# Patient Record
Sex: FEMALE
[De-identification: names, ages, dates, MRNs, and addresses within clinical notes are randomized per-mention and may not be internally consistent; named-entity substitution may affect disease eponyms.]

---

## 2018-01-31 ENCOUNTER — HOSPITAL ENCOUNTER (EMERGENCY)
Dept: HOSPITAL 92 - ERS | Age: 48
Discharge: HOME | End: 2018-01-31
Payer: SELF-PAY

## 2018-01-31 DIAGNOSIS — R07.89: ICD-10-CM

## 2018-01-31 DIAGNOSIS — I45.6: ICD-10-CM

## 2018-01-31 DIAGNOSIS — J11.1: Primary | ICD-10-CM

## 2018-01-31 DIAGNOSIS — G43.909: ICD-10-CM

## 2018-01-31 DIAGNOSIS — Z79.899: ICD-10-CM

## 2018-01-31 DIAGNOSIS — J45.909: ICD-10-CM

## 2018-01-31 LAB
ALBUMIN SERPL BCG-MCNC: 3.9 G/DL (ref 3.5–5)
ALP SERPL-CCNC: 69 U/L (ref 40–150)
ALT SERPL W P-5'-P-CCNC: 12 U/L (ref 8–55)
ANION GAP SERPL CALC-SCNC: 12 MMOL/L (ref 10–20)
AST SERPL-CCNC: 12 U/L (ref 5–34)
BASOPHILS # BLD AUTO: 0.1 THOU/UL (ref 0–0.2)
BASOPHILS NFR BLD AUTO: 0.7 % (ref 0–1)
BILIRUB SERPL-MCNC: 0.6 MG/DL (ref 0.2–1.2)
BUN SERPL-MCNC: 10 MG/DL (ref 7–18.7)
CALCIUM SERPL-MCNC: 8.9 MG/DL (ref 7.8–10.44)
CHLORIDE SERPL-SCNC: 104 MMOL/L (ref 98–107)
CK MB SERPL-MCNC: 0.8 NG/ML (ref 0–6.6)
CK SERPL-CCNC: 71 U/L (ref 29–168)
CO2 SERPL-SCNC: 24 MMOL/L (ref 22–29)
CREAT CL PREDICTED SERPL C-G-VRATE: 0 ML/MIN (ref 70–130)
EOSINOPHIL # BLD AUTO: 0.4 THOU/UL (ref 0–0.7)
EOSINOPHIL NFR BLD AUTO: 3.8 % (ref 0–10)
GLOBULIN SER CALC-MCNC: 3.7 G/DL (ref 2.4–3.5)
GLUCOSE SERPL-MCNC: 94 MG/DL (ref 70–105)
HGB BLD-MCNC: 13.9 G/DL (ref 12–16)
LIPASE SERPL-CCNC: 30 U/L (ref 8–78)
LYMPHOCYTES # BLD: 2.5 THOU/UL (ref 1.2–3.4)
LYMPHOCYTES NFR BLD AUTO: 26.8 % (ref 21–51)
MCH RBC QN AUTO: 31.8 PG (ref 27–31)
MCV RBC AUTO: 94.2 FL (ref 81–99)
MONOCYTES # BLD AUTO: 0.6 THOU/UL (ref 0.11–0.59)
MONOCYTES NFR BLD AUTO: 5.8 % (ref 0–10)
NEUTROPHILS # BLD AUTO: 5.9 THOU/UL (ref 1.4–6.5)
NEUTROPHILS NFR BLD AUTO: 62.8 % (ref 42–75)
PLATELET # BLD AUTO: 236 THOU/UL (ref 130–400)
POTASSIUM SERPL-SCNC: 3.9 MMOL/L (ref 3.5–5.1)
RBC # BLD AUTO: 4.36 MILL/UL (ref 4.2–5.4)
SODIUM SERPL-SCNC: 136 MMOL/L (ref 136–145)
TROPONIN I SERPL DL<=0.01 NG/ML-MCNC: 0.01 NG/ML (ref ?–0.03)
WBC # BLD AUTO: 9.4 THOU/UL (ref 4.8–10.8)

## 2018-01-31 PROCEDURE — 71045 X-RAY EXAM CHEST 1 VIEW: CPT

## 2018-01-31 PROCEDURE — 83690 ASSAY OF LIPASE: CPT

## 2018-01-31 PROCEDURE — 80053 COMPREHEN METABOLIC PANEL: CPT

## 2018-01-31 PROCEDURE — 84484 ASSAY OF TROPONIN QUANT: CPT

## 2018-01-31 PROCEDURE — 82553 CREATINE MB FRACTION: CPT

## 2018-01-31 PROCEDURE — 36415 COLL VENOUS BLD VENIPUNCTURE: CPT

## 2018-01-31 PROCEDURE — 96374 THER/PROPH/DIAG INJ IV PUSH: CPT

## 2018-01-31 PROCEDURE — 85025 COMPLETE CBC W/AUTO DIFF WBC: CPT

## 2018-01-31 PROCEDURE — 93005 ELECTROCARDIOGRAM TRACING: CPT

## 2018-01-31 NOTE — RAD
PORTABLE CHEST:

1/31/18

 

HISTORY: 

Patient with heart palpitations and shortness of breath. 

 

COMPARISON:  

8/17/17 study.

 

Heart size and mediastinum are within normal limits. The lungs are clear of infiltrates. No bony find
ings.

 

IMPRESSION:  

No active intrathoracic disease. 

 

POS: [facility]

## 2018-02-03 NOTE — EKG
Test Reason : 

Blood Pressure : ***/*** mmHG

Vent. Rate : 081 BPM     Atrial Rate : 081 BPM

   P-R Int : 136 ms          QRS Dur : 076 ms

    QT Int : 382 ms       P-R-T Axes : 054 029 037 degrees

   QTc Int : 443 ms

 

Normal sinus rhythm

Normal ECG

 

Confirmed by ERI RODRIGUEZ, GRACE (12),  JAEL RANGEL ([id number]) on 2/3/2018 12:09:38 PM

 

Referred By:             Confirmed By:GRACE HWANG MD

## 2018-05-03 ENCOUNTER — HOSPITAL ENCOUNTER (INPATIENT)
Dept: HOSPITAL 92 - ERS | Age: 48
LOS: 8 days | Discharge: HOME | DRG: 103 | End: 2018-05-11
Attending: INTERNAL MEDICINE | Admitting: INTERNAL MEDICINE
Payer: SELF-PAY

## 2018-05-03 VITALS — BODY MASS INDEX: 38.7 KG/M2

## 2018-05-03 DIAGNOSIS — I45.6: ICD-10-CM

## 2018-05-03 DIAGNOSIS — Z88.2: ICD-10-CM

## 2018-05-03 DIAGNOSIS — G43.911: Primary | ICD-10-CM

## 2018-05-03 DIAGNOSIS — E66.9: ICD-10-CM

## 2018-05-03 DIAGNOSIS — G62.9: ICD-10-CM

## 2018-05-03 DIAGNOSIS — F41.9: ICD-10-CM

## 2018-05-03 DIAGNOSIS — H81.10: ICD-10-CM

## 2018-05-03 DIAGNOSIS — Z88.0: ICD-10-CM

## 2018-05-03 DIAGNOSIS — E03.9: ICD-10-CM

## 2018-05-03 DIAGNOSIS — R00.0: ICD-10-CM

## 2018-05-03 LAB
ALBUMIN SERPL BCG-MCNC: 4.2 G/DL (ref 3.5–5)
ALP SERPL-CCNC: 69 U/L (ref 40–150)
ALT SERPL W P-5'-P-CCNC: 17 U/L (ref 8–55)
ANION GAP SERPL CALC-SCNC: 13 MMOL/L (ref 10–20)
AST SERPL-CCNC: 17 U/L (ref 5–34)
BASOPHILS # BLD AUTO: 0.1 THOU/UL (ref 0–0.2)
BASOPHILS NFR BLD AUTO: 1 % (ref 0–1)
BILIRUB SERPL-MCNC: 0.3 MG/DL (ref 0.2–1.2)
BUN SERPL-MCNC: 13 MG/DL (ref 7–18.7)
CALCIUM SERPL-MCNC: 9.6 MG/DL (ref 7.8–10.44)
CHLORIDE SERPL-SCNC: 102 MMOL/L (ref 98–107)
CK MB SERPL-MCNC: 1 NG/ML (ref 0–6.6)
CO2 SERPL-SCNC: 25 MMOL/L (ref 22–29)
CREAT CL PREDICTED SERPL C-G-VRATE: 0 ML/MIN (ref 70–130)
CRYSTAL-AUWI FLAG: 0 (ref 0–15)
DRUG SCREEN CUTOFF: (no result)
EOSINOPHIL # BLD AUTO: 0.3 THOU/UL (ref 0–0.7)
EOSINOPHIL NFR BLD AUTO: 2.9 % (ref 0–10)
GLOBULIN SER CALC-MCNC: 4.1 G/DL (ref 2.4–3.5)
GLUCOSE SERPL-MCNC: 94 MG/DL (ref 70–105)
HEV IGM SER QL: 1.1 (ref 0–7.99)
HGB BLD-MCNC: 15.2 G/DL (ref 12–16)
HYALINE CASTS #/AREA URNS LPF: (no result) LPF
LYMPHOCYTES # BLD: 2.6 THOU/UL (ref 1.2–3.4)
LYMPHOCYTES NFR BLD AUTO: 23.2 % (ref 21–51)
MCH RBC QN AUTO: 31.2 PG (ref 27–31)
MCV RBC AUTO: 92.9 FL (ref 81–99)
MEDTOX CONTROL LINE VALID?: (no result)
MEDTOX READER #: (no result)
MONOCYTES # BLD AUTO: 0.7 THOU/UL (ref 0.11–0.59)
MONOCYTES NFR BLD AUTO: 6 % (ref 0–10)
NEUTROPHILS # BLD AUTO: 7.5 THOU/UL (ref 1.4–6.5)
NEUTROPHILS NFR BLD AUTO: 66.9 % (ref 42–75)
PATHC CAST-AUWI FLAG: 0.14 (ref 0–2.49)
PLATELET # BLD AUTO: 296 THOU/UL (ref 130–400)
POTASSIUM SERPL-SCNC: 3.8 MMOL/L (ref 3.5–5.1)
RBC # BLD AUTO: 4.86 MILL/UL (ref 4.2–5.4)
RBC UR QL AUTO: (no result) HPF (ref 0–3)
SODIUM SERPL-SCNC: 136 MMOL/L (ref 136–145)
SP GR UR STRIP: 1.01 (ref 1–1.04)
SPERM-AUWI FLAG: 0 (ref 0–9.9)
TROPONIN I SERPL DL<=0.01 NG/ML-MCNC: (no result) NG/ML (ref ?–0.03)
WBC # BLD AUTO: 11.2 THOU/UL (ref 4.8–10.8)
YEAST-AUWI FLAG: 0 (ref 0–25)

## 2018-05-03 PROCEDURE — 36415 COLL VENOUS BLD VENIPUNCTURE: CPT

## 2018-05-03 PROCEDURE — A4216 STERILE WATER/SALINE, 10 ML: HCPCS

## 2018-05-03 PROCEDURE — 96376 TX/PRO/DX INJ SAME DRUG ADON: CPT

## 2018-05-03 PROCEDURE — 87070 CULTURE OTHR SPECIMN AEROBIC: CPT

## 2018-05-03 PROCEDURE — 70551 MRI BRAIN STEM W/O DYE: CPT

## 2018-05-03 PROCEDURE — 84484 ASSAY OF TROPONIN QUANT: CPT

## 2018-05-03 PROCEDURE — 93005 ELECTROCARDIOGRAM TRACING: CPT

## 2018-05-03 PROCEDURE — 80048 BASIC METABOLIC PNL TOTAL CA: CPT

## 2018-05-03 PROCEDURE — 96365 THER/PROPH/DIAG IV INF INIT: CPT

## 2018-05-03 PROCEDURE — 96367 TX/PROPH/DG ADDL SEQ IV INF: CPT

## 2018-05-03 PROCEDURE — 81015 MICROSCOPIC EXAM OF URINE: CPT

## 2018-05-03 PROCEDURE — 85025 COMPLETE CBC W/AUTO DIFF WBC: CPT

## 2018-05-03 PROCEDURE — 83605 ASSAY OF LACTIC ACID: CPT

## 2018-05-03 PROCEDURE — 80053 COMPREHEN METABOLIC PANEL: CPT

## 2018-05-03 PROCEDURE — 82553 CREATINE MB FRACTION: CPT

## 2018-05-03 PROCEDURE — 80306 DRUG TEST PRSMV INSTRMNT: CPT

## 2018-05-03 PROCEDURE — 96375 TX/PRO/DX INJ NEW DRUG ADDON: CPT

## 2018-05-03 PROCEDURE — 89051 BODY FLUID CELL COUNT: CPT

## 2018-05-03 PROCEDURE — 87205 SMEAR GRAM STAIN: CPT

## 2018-05-03 PROCEDURE — 62270 DX LMBR SPI PNXR: CPT

## 2018-05-03 PROCEDURE — 83735 ASSAY OF MAGNESIUM: CPT

## 2018-05-03 PROCEDURE — 82945 GLUCOSE OTHER FLUID: CPT

## 2018-05-03 PROCEDURE — 70450 CT HEAD/BRAIN W/O DYE: CPT

## 2018-05-03 PROCEDURE — 84157 ASSAY OF PROTEIN OTHER: CPT

## 2018-05-03 PROCEDURE — 81003 URINALYSIS AUTO W/O SCOPE: CPT

## 2018-05-04 NOTE — CT
HEAD CT WITHOUT CONTRAST:

 

05/04/2018

 

HISTORY:

Headache with nausea.

 

COMPARISON:

07/09/2017

 

TECHNIQUE:

Serial axial CT imaging at 5 mm intervals, from the vertex through the skull base, without contrast.

 

FINDINGS:

The imaged paranasal sinuses/mastoid air cells are well aerated.  There is no displaced calvarial fra
cture.

 

No intracranial hemorrhage, midline shift, mass effect, or ventricular enlargement.

 

IMPRESSION:

No acute findings.

 

POS: [facility]

## 2018-05-04 NOTE — CON
DATE OF CONSULTATION:  05/04/2018

 

CONSULTING PHYSICIAN:  [facility].

 

IMPRESSION:

1.  Probable migraine.

2.  Questionable history of pseudotumor cerebri.

 

PLAN:  Lucita protocol.

 

HISTORY OF PRESENT ILLNESS:  Ms. Bowers is a 47-year-old white female who reports developing acute on
set of a severe headache yesterday.  She has had some intermittent headaches prior to this.  There wa
s some questionable diagnosis of pseudotumor cerebri.  She was given some medication since admission,
 but is still experiencing a generalized throbbing headache.  She has had some nausea and vomiting as
sociated with it.  Her vision has been a bit distorted.  There is no transient vision loss.  Her CT s
can of the brain was unremarkable.

 

PAST MEDICAL HISTORY:  Intermittent headache associated with migraine.

 

ALLERGIES:  MEPERIDINE, PENICILLIN, SULFA, IODINE.

 

FAMILY HISTORY:  Noncontributory.

 

SOCIAL HISTORY:  Unremarkable.

 

REVIEW OF SYSTEMS:  Otherwise, negative for any focal neurologic symptoms.

 

PHYSICAL EXAMINATION:  Generally she is alert and appropriate.  Her speech is fluent and clear.  Exam
 is nonfocal.  She is photophobic.

 

SUMMARY:  Given the acute onset of headache with a normal CT scan, I suspect this is likely migraine.
  We will see if she responds to the Lucita protocol.
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
## 2018-05-05 LAB
ANION GAP SERPL CALC-SCNC: 13 MMOL/L (ref 10–20)
BASOPHILS # BLD AUTO: 0 THOU/UL (ref 0–0.2)
BASOPHILS NFR BLD AUTO: 0.1 % (ref 0–1)
BUN SERPL-MCNC: 13 MG/DL (ref 7–18.7)
CALCIUM SERPL-MCNC: 8.6 MG/DL (ref 7.8–10.44)
CHLORIDE SERPL-SCNC: 106 MMOL/L (ref 98–107)
CO2 SERPL-SCNC: 22 MMOL/L (ref 22–29)
CREAT CL PREDICTED SERPL C-G-VRATE: 163 ML/MIN (ref 70–130)
EOSINOPHIL # BLD AUTO: 0 THOU/UL (ref 0–0.7)
EOSINOPHIL NFR BLD AUTO: 0.1 % (ref 0–10)
GLUCOSE SERPL-MCNC: 119 MG/DL (ref 70–105)
HGB BLD-MCNC: 14.3 G/DL (ref 12–16)
LYMPHOCYTES # BLD: 1.1 THOU/UL (ref 1.2–3.4)
LYMPHOCYTES NFR BLD AUTO: 7.3 % (ref 21–51)
MCH RBC QN AUTO: 31.6 PG (ref 27–31)
MCV RBC AUTO: 94.2 FL (ref 81–99)
MONOCYTES # BLD AUTO: 0.3 THOU/UL (ref 0.11–0.59)
MONOCYTES NFR BLD AUTO: 2.2 % (ref 0–10)
NEUTROPHILS # BLD AUTO: 14.1 THOU/UL (ref 1.4–6.5)
NEUTROPHILS NFR BLD AUTO: 90.3 % (ref 42–75)
PLATELET # BLD AUTO: 277 THOU/UL (ref 130–400)
POTASSIUM SERPL-SCNC: 4.3 MMOL/L (ref 3.5–5.1)
RBC # BLD AUTO: 4.51 MILL/UL (ref 4.2–5.4)
SODIUM SERPL-SCNC: 137 MMOL/L (ref 136–145)
WBC # BLD AUTO: 15.6 THOU/UL (ref 4.8–10.8)

## 2018-05-05 RX ADMIN — Medication SCH: at 09:24

## 2018-05-05 RX ADMIN — Medication SCH: at 21:56

## 2018-05-06 RX ADMIN — GUAIFENESIN AND DEXTROMETHORPHAN PRN ML: 100; 10 SYRUP ORAL at 10:52

## 2018-05-06 RX ADMIN — Medication SCH: at 15:02

## 2018-05-06 RX ADMIN — Medication SCH ML: at 21:37

## 2018-05-07 LAB
RBC # CSF MANUAL: 68 /CUMM
SPECIMEN SOURCE FLD: (no result)
WBC # CSF MANUAL: 4 /CUMM (ref 0–5)

## 2018-05-07 PROCEDURE — B01B1ZZ FLUOROSCOPY OF SPINAL CORD USING LOW OSMOLAR CONTRAST: ICD-10-PCS | Performed by: RADIOLOGY

## 2018-05-07 PROCEDURE — 009U3ZX DRAINAGE OF SPINAL CANAL, PERCUTANEOUS APPROACH, DIAGNOSTIC: ICD-10-PCS | Performed by: RADIOLOGY

## 2018-05-07 RX ADMIN — Medication SCH: at 20:56

## 2018-05-07 RX ADMIN — Medication SCH: at 09:08

## 2018-05-07 NOTE — RAD
FLUOROSCOPIC GUIDED LUMBAR PUNCTURE:

 

DATE: 5/7/18.

 

HISTORY: 

Intractable headache.  Headache is getting worse.  History of prior subarachnoid hemorrhage.

 

TECHNIQUE: 

The procedure including the risks and complications were explained to the patient and informed consen
t was obtained.  The patient was placed on the fluoroscopy table in the prone position.  Limited fluo
roscopic evaluation of the lumbar spine was performed.  An area at the L3-4 level was marked and them
 meticulously prepped and draped in the usual sterile fashion.  The skin and subcutaneous tissues wer
e infiltrated with buffered 1% Lidocaine for local anesthesia.  Utilizing fluoroscopic guidance, a 22
-gauge spinal needle was advanced into the thecal sac.  The inner stylette was removed with return of
 clear cerebrospinal fluid.  Opening pressure was obtained.  Approximately 10 mL of clear cerebrospin
al fluid was collected and closing pressure was then performed.  

 

The inner stylette was replaced, and the needle was removed.  Hemostasis was achieved with direct pre
ssure.  A dry sterile dressing was placed at the puncture site. 

 

The patient tolerated the procedure well and without immediate complication.

 

FINDINGS: 

Technically successful lumbar puncture.  Approximately 10 mL of clear cerebrospinal fluid was collect
ed.  The patient did have elevated opening pressure of 33 cm of water with elevated closing pressure 
of 23 cm of water.

 

 AP and lateral views lumbar spine demonstrate mild degenerative changes.  Vertebral body height
s are within normal limits and there is no fracture or subluxation.

 

FLUOROSCOPY:

Total fluoroscopy time is 0.4 minutes with total dose of 90.9 mGy.

 

IMPRESSION: 

1.  Technically successful fluoroscopic-guided lumbar puncture.

 

2.  Elevated opening pressure.

 

3.  The above findings were discussed with Dr. Worthington on 5/7/18 at 1411 hours.

 

 

CODE CR

 

POS: [facility]

## 2018-05-08 RX ADMIN — Medication SCH: at 12:05

## 2018-05-08 RX ADMIN — GUAIFENESIN AND DEXTROMETHORPHAN PRN ML: 100; 10 SYRUP ORAL at 21:50

## 2018-05-09 LAB
ANION GAP SERPL CALC-SCNC: 9 MMOL/L (ref 10–20)
BASOPHILS # BLD AUTO: 0 THOU/UL (ref 0–0.2)
BASOPHILS NFR BLD AUTO: 0.4 % (ref 0–1)
BUN SERPL-MCNC: 9 MG/DL (ref 7–18.7)
CALCIUM SERPL-MCNC: 8.7 MG/DL (ref 7.8–10.44)
CHLORIDE SERPL-SCNC: 103 MMOL/L (ref 98–107)
CO2 SERPL-SCNC: 29 MMOL/L (ref 22–29)
CREAT CL PREDICTED SERPL C-G-VRATE: 161 ML/MIN (ref 70–130)
EOSINOPHIL # BLD AUTO: 0.7 THOU/UL (ref 0–0.7)
EOSINOPHIL NFR BLD AUTO: 7.8 % (ref 0–10)
GLUCOSE SERPL-MCNC: 89 MG/DL (ref 70–105)
HGB BLD-MCNC: 13.2 G/DL (ref 12–16)
LYMPHOCYTES # BLD: 2.8 THOU/UL (ref 1.2–3.4)
LYMPHOCYTES NFR BLD AUTO: 31.1 % (ref 21–51)
MAGNESIUM SERPL-MCNC: 2 MG/DL (ref 1.6–2.6)
MCH RBC QN AUTO: 31.1 PG (ref 27–31)
MCV RBC AUTO: 95.2 FL (ref 81–99)
MONOCYTES # BLD AUTO: 0.7 THOU/UL (ref 0.11–0.59)
MONOCYTES NFR BLD AUTO: 8.1 % (ref 0–10)
NEUTROPHILS # BLD AUTO: 4.7 THOU/UL (ref 1.4–6.5)
NEUTROPHILS NFR BLD AUTO: 52.6 % (ref 42–75)
PLATELET # BLD AUTO: 221 THOU/UL (ref 130–400)
POTASSIUM SERPL-SCNC: 4.3 MMOL/L (ref 3.5–5.1)
RBC # BLD AUTO: 4.26 MILL/UL (ref 4.2–5.4)
SODIUM SERPL-SCNC: 137 MMOL/L (ref 136–145)
WBC # BLD AUTO: 8.9 THOU/UL (ref 4.8–10.8)

## 2018-05-09 RX ADMIN — Medication SCH ML: at 22:41

## 2018-05-09 RX ADMIN — Medication SCH: at 06:39

## 2018-05-09 RX ADMIN — Medication SCH ML: at 09:57

## 2018-05-09 NOTE — CT
PRELIMINARY REPORT/[facility]/EMERGENTY AFTER-HOURS PROCEDURE 

 

CT Head Without Intravenous Contrast

 

CLINICAL HISTORY:

47 years old, female; Pain; Headache; Patient HX: Worsening headache, lumbar puncture done earlier to
day

 

TECHNIQUE:

Axial computed tomography images of the head/brain without intravenous contrast.

 

COMPARISON:

No relevant prior studies available.

 

FINDINGS:

Brain: Low-lying cerebellar tonsils which may sectional No hemorrhage. No significant white matter di
sease. No edema.

Ventricles: Unremarkable. No ventriculomegaly.

Bones/joints: Unremarkable. No acute fracture.

Soft tissues: Unremarkable.

Sinuses: Unremarkable as visualized. No acute sinusitis.

Mastoid air cells: Unremarkable as visualized. No mastoid effusion.

 

IMPRESSION:

Low lying cerebellar tonsils which may. Further evaluation with MRI as clinically indicated if clinic
al concern for intracranial hypotension suspected

No intracranial hemorrhage. Please see discussion above.

 

Thank you for allowing us to participate in the care of your patient.

Dictated and Authenticated by: Jairo Spears MD

05/09/2018 12:32 AM Central Time ([place] & [place])

 

 

FINAL REPORT

EMERGENCY AFTER HOURS CT HEAD:

 

Date:  05/09/18 

 

HISTORY:  

Worsening headache. Lumbar puncture done earlier today. 

 

COMPARISON:  

05/04/18. 

 

IMPRESSION: 

1.  No acute intracranial abnormalities demonstrated. 

 

2.  Cerebellar tonsils do appear to be low-lying. However, this could be attributable to slice select
ion, and this is not well evaluated on CT exam. MRI may be helpful to further evaluate for possibilit
y of low-lying cerebellar tonsils. 

 

Findings are in agreement with the preliminary report by [facility]. 

 

 

POS: [facility]

## 2018-05-09 NOTE — PDOC.EVN
Event Note





- Event Note


Event Note: 





RN called earlier with worsening headache. Had LP earlier today. CT brain 

ordered.

## 2018-05-10 RX ADMIN — Medication SCH ML: at 22:02

## 2018-05-10 RX ADMIN — Medication SCH ML: at 08:45

## 2018-05-11 VITALS — DIASTOLIC BLOOD PRESSURE: 71 MMHG | SYSTOLIC BLOOD PRESSURE: 105 MMHG

## 2018-05-11 VITALS — TEMPERATURE: 98.1 F

## 2018-05-11 RX ADMIN — Medication SCH ML: at 11:17

## 2018-05-11 RX ADMIN — Medication PRN ML: at 04:07

## 2018-05-11 RX ADMIN — Medication SCH ML: at 07:49

## 2018-05-11 RX ADMIN — Medication PRN ML: at 07:49

## 2018-05-11 NOTE — MRI
MRI OF BRAIN NONCONTRAST:

 

INDICATION: 

Intractable headache.

 

FINDINGS: 

Reference is made to 7/5/17 brain MRI.

 

Ventricular system is normal in size.  There is no evidence of acute territorial infarction, intracra
nial mass effect, or midline shift.  No intracranial hemorrhagic susceptibility.  Limited evaluation 
of skull base flow voids due to patient motion.  No new, significant intraaxial signal abnormalities 
are seen.  Redemonstration of a partially empty sella. 

 

IMPRESSION: 

There are no acute intracranial abnormalities.

 

POS: [facility]

## 2018-05-11 NOTE — DIS
DATE OF ADMISSION:  05/04/2018

 

DATE OF DISCHARGE:  05/11/2018

 

DISCHARGE DIAGNOSES:

1.  Intractable migraine headache.

2.  Nausea and vomiting secondary to #1, resolved.

3.  Vertigo, benign positional, improved.

4.  Peripheral neuropathy.

 

CONSULTATIONS:  Dr. Mikie Iraheta with Neurology Service.

 

PERTINENT LABORATORY AND X-RAY FINDINGS:  Complete metabolic profile within normal limits.  Magnesium
 2.0.  CBC showed a white blood cell count ranging between 8.9-15.6.  Urine drug screen dated 05/03/2
018 negative.  CSF fluid culture dated 05/07/2018 showed no growth at 4 days.  CT of the brain withou
t contrast dated 05/04/2018 showed no acute intracranial process.  CT of the brain dated 05/08/2018 s
howed no intracranial hemorrhage.  Low lying cerebellar tonsils.  MRI of the brain dated 05/11/2018 s
howed no acute intracranial process.

 

HOSPITAL COURSE:  Patient was admitted to the medical floor after presenting with intractable headach
e likely migraine variant.  The patient underwent a comprehensive evaluation including neuro imaging 
studies showing no acute process.  The patient underwent lumbar puncture with CSF analysis showing no
 evidence of infectious process.  The patient received IV valproic acid, Ultram, Neurontin, and morph
ine sulfate for symptomatic relief.  The patient was slow to clinically improve over the hospital cou
rse with persistent headache and nausea.  The patient received antiemetics, IV fluids, and general quevedo
pportive measures.  The patient was evaluated by the Neurology Service due to the severity of her pre
sent symptoms and slow clinical improvement.  The patient continued to receive supportive measures in
cluding NSAIDs with Motrin and Toradol.  The patient did clinically improve by the time of discharge 
and remained stable throughout the hospital course.  I have examined the patient at the time of disch
arge and discussed radiographic and laboratory findings.  The patient verbalizes understanding and ag
reement and will discharge home on 05/11/2018.

 

DISCHARGE MEDICATIONS:

1.  Gabapentin 200 mg p.o. t.i.d.

2.  Motrin 800 mg p.o. t.i.d. p.r.n.

3.  Meclizine 25 mg p.o. q.6 hours p.r.n.

4.  Prednisone 20 mg 1 tablet p.o. daily x 3 days, followed by half a tab p.o. daily x3 days.

5.  Depakene 500 mg p.o. b.i.d.

 

FOLLOWUP:  The patient will follow up with local [facility] on an as needed basis.

 

CONDITION ON DISCHARGE:  Stable.

 

ACTIVITY:  Ad nicola.

 

DIET:  Regular.

 

CODE STATUS:  FULL.

 

DISPOSITION:  Home on 05/11/2018.

## 2018-05-23 ENCOUNTER — HOSPITAL ENCOUNTER (INPATIENT)
Dept: HOSPITAL 92 - ERS | Age: 48
LOS: 5 days | Discharge: HOME | DRG: 103 | End: 2018-05-28
Attending: INTERNAL MEDICINE | Admitting: INTERNAL MEDICINE
Payer: SELF-PAY

## 2018-05-23 VITALS — BODY MASS INDEX: 39.9 KG/M2

## 2018-05-23 DIAGNOSIS — G93.2: ICD-10-CM

## 2018-05-23 DIAGNOSIS — G43.111: Primary | ICD-10-CM

## 2018-05-23 DIAGNOSIS — E66.9: ICD-10-CM

## 2018-05-23 DIAGNOSIS — I45.6: ICD-10-CM

## 2018-05-23 DIAGNOSIS — J45.909: ICD-10-CM

## 2018-05-23 PROCEDURE — 36415 COLL VENOUS BLD VENIPUNCTURE: CPT

## 2018-05-23 PROCEDURE — 85025 COMPLETE CBC W/AUTO DIFF WBC: CPT

## 2018-05-23 PROCEDURE — 96365 THER/PROPH/DIAG IV INF INIT: CPT

## 2018-05-23 PROCEDURE — 80053 COMPREHEN METABOLIC PANEL: CPT

## 2018-05-23 PROCEDURE — 70450 CT HEAD/BRAIN W/O DYE: CPT

## 2018-05-23 PROCEDURE — 82553 CREATINE MB FRACTION: CPT

## 2018-05-23 PROCEDURE — 96361 HYDRATE IV INFUSION ADD-ON: CPT

## 2018-05-23 PROCEDURE — 93005 ELECTROCARDIOGRAM TRACING: CPT

## 2018-05-23 PROCEDURE — 94760 N-INVAS EAR/PLS OXIMETRY 1: CPT

## 2018-05-23 PROCEDURE — 84484 ASSAY OF TROPONIN QUANT: CPT

## 2018-05-23 PROCEDURE — 71045 X-RAY EXAM CHEST 1 VIEW: CPT

## 2018-05-23 PROCEDURE — 70496 CT ANGIOGRAPHY HEAD: CPT

## 2018-05-23 PROCEDURE — 70498 CT ANGIOGRAPHY NECK: CPT

## 2018-05-23 PROCEDURE — 81015 MICROSCOPIC EXAM OF URINE: CPT

## 2018-05-23 PROCEDURE — 96375 TX/PRO/DX INJ NEW DRUG ADDON: CPT

## 2018-05-23 PROCEDURE — A4216 STERILE WATER/SALINE, 10 ML: HCPCS

## 2018-05-23 PROCEDURE — 84443 ASSAY THYROID STIM HORMONE: CPT

## 2018-05-23 PROCEDURE — 36416 COLLJ CAPILLARY BLOOD SPEC: CPT

## 2018-05-23 PROCEDURE — S0028 INJECTION, FAMOTIDINE, 20 MG: HCPCS

## 2018-05-23 PROCEDURE — 81025 URINE PREGNANCY TEST: CPT

## 2018-05-23 PROCEDURE — 96367 TX/PROPH/DG ADDL SEQ IV INF: CPT

## 2018-05-23 PROCEDURE — 81003 URINALYSIS AUTO W/O SCOPE: CPT

## 2018-05-23 PROCEDURE — 83735 ASSAY OF MAGNESIUM: CPT

## 2018-05-23 PROCEDURE — 80048 BASIC METABOLIC PNL TOTAL CA: CPT

## 2018-05-24 LAB
ALBUMIN SERPL BCG-MCNC: 3.6 G/DL (ref 3.5–5)
ALP SERPL-CCNC: 54 U/L (ref 40–150)
ALT SERPL W P-5'-P-CCNC: 11 U/L (ref 8–55)
ANION GAP SERPL CALC-SCNC: 8 MMOL/L (ref 10–20)
AST SERPL-CCNC: 10 U/L (ref 5–34)
BASOPHILS # BLD AUTO: 0.1 THOU/UL (ref 0–0.2)
BASOPHILS NFR BLD AUTO: 0.9 % (ref 0–1)
BILIRUB SERPL-MCNC: 0.4 MG/DL (ref 0.2–1.2)
BUN SERPL-MCNC: 12 MG/DL (ref 7–18.7)
CALCIUM SERPL-MCNC: 8.8 MG/DL (ref 7.8–10.44)
CHLORIDE SERPL-SCNC: 105 MMOL/L (ref 98–107)
CK MB SERPL-MCNC: 0.3 NG/ML (ref 0–6.6)
CO2 SERPL-SCNC: 27 MMOL/L (ref 22–29)
CREAT CL PREDICTED SERPL C-G-VRATE: 0 ML/MIN (ref 70–130)
CRYSTAL-AUWI FLAG: 0 (ref 0–15)
EOSINOPHIL # BLD AUTO: 0.4 THOU/UL (ref 0–0.7)
EOSINOPHIL NFR BLD AUTO: 3.3 % (ref 0–10)
GLOBULIN SER CALC-MCNC: 3.2 G/DL (ref 2.4–3.5)
GLUCOSE SERPL-MCNC: 98 MG/DL (ref 70–105)
HEV IGM SER QL: 1.1 (ref 0–7.99)
HGB BLD-MCNC: 13.4 G/DL (ref 12–16)
HYALINE CASTS #/AREA URNS LPF: (no result) LPF
LYMPHOCYTES # BLD: 2.6 THOU/UL (ref 1.2–3.4)
LYMPHOCYTES NFR BLD AUTO: 22.9 % (ref 21–51)
MAGNESIUM SERPL-MCNC: 1.8 MG/DL (ref 1.6–2.6)
MCH RBC QN AUTO: 31.4 PG (ref 27–31)
MCV RBC AUTO: 93.6 FL (ref 81–99)
MONOCYTES # BLD AUTO: 0.9 THOU/UL (ref 0.11–0.59)
MONOCYTES NFR BLD AUTO: 7.5 % (ref 0–10)
NEUTROPHILS # BLD AUTO: 7.5 THOU/UL (ref 1.4–6.5)
NEUTROPHILS NFR BLD AUTO: 65.3 % (ref 42–75)
PATHC CAST-AUWI FLAG: 0 (ref 0–2.49)
PLATELET # BLD AUTO: 268 THOU/UL (ref 130–400)
POTASSIUM SERPL-SCNC: 3.8 MMOL/L (ref 3.5–5.1)
PREGU CONTROL BACKGROUND?: (no result)
PREGU CONTROL BAR APPEAR?: YES
RBC # BLD AUTO: 4.26 MILL/UL (ref 4.2–5.4)
RBC UR QL AUTO: (no result) HPF (ref 0–3)
SODIUM SERPL-SCNC: 136 MMOL/L (ref 136–145)
SP GR UR STRIP: (no result) (ref 1–1.04)
SPERM-AUWI FLAG: 0 (ref 0–9.9)
TROPONIN I SERPL DL<=0.01 NG/ML-MCNC: (no result) NG/ML (ref ?–0.03)
WBC # BLD AUTO: 11.5 THOU/UL (ref 4.8–10.8)
WBC UR QL AUTO: (no result) HPF (ref 0–3)
YEAST-AUWI FLAG: 0 (ref 0–25)

## 2018-05-24 NOTE — CT
PRELIMINARY REPORT/[facility]/EMERGENTY AFTER-HOURS PROCEDURE 

 

CT Angiography Head With Intravenous Contrast

 

CLINICAL HISTORY:

47 years old, female; Pain; Headache; Patient HX: Er 12; F47 presented to ed C/O intermittent HA with
 gradual onset 3 days ago. Pt reports feeling weakness with symptoms. Pt denies fever. Pt denies

position making the HA better or worse. Pt denies injury or trauma to her head or neck. Pt also repor
ts feeling tingly all over. Pt reports this HA is more severe from her previous episode. Neck pain rt
 sided; Additional info: *iv leaked during injection

 

TECHNIQUE:

Axial computed tomographic angiography images of the head with intravenous contrast using CT angiogra
phy protocol. MIP reconstructed images were created and reviewed. Coronal and sagittal reformatted im
ages were created and reviewed.

 

COMPARISON:

No relevant prior studies available.

 

FINDINGS:

Right internal carotid artery: No acute findings. Intracranial segment is patent with no significant 
stenosis. No aneurysm.

Right anterior cerebral artery: No acute findings. 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 No occ
lusion or significant stenosis. No aneurysm.

Right middle cerebral artery: No acute findings. No occlusion or significant stenosis. No aneurysm.

Right posterior cerebral artery: No acute findings. No occlusion or significant stenosis. No aneurysm
.

Right vertebral artery: Unremarkable as visualized.

Left internal carotid artery: No acute findings. Intracranial segment is patent with no significant s
tenosis. No aneurysm.

Left anterior cerebral artery: No acute findings. No occlusion or significant stenosis. No aneurysm.

Left middle cerebral artery: No acute findings. No occlusion or significant stenosis. No aneurysm.

Left posterior cerebral artery: No acute findings. No occlusion or significant stenosis. No aneurysm.


Left vertebral artery: Unremarkable as visualized.

Basilar artery: No acute findings. No occlusion or significant stenosis. No aneurysm.

Incidental note made of mild to moderate mucosal reaction, right maxillary sinus.

 

IMPRESSION:

No occlusion or significant stenosis. No aneurysm.

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

Incidental note made of mild to moderate mucosal reaction, right maxillary sinus.

_______________________________________________

CT Angiography Neck With Intravenous Contrast

 

TECHNIQUE:

Axial computed tomographic angiography images of the neck with intravenous contrast using CT angiogra
phy protocol.

 

COMPARISON:

CT Brain WO Con 2018-05-24 00:54

 

FINDINGS:

 

VASCULATURE:

Right common carotid artery: No acute findings. No significant stenosis. No dissection or occlusion.

Right internal carotid artery: No acute findings. Extracranial segment is patent with no significant 
stenosis. No dissection or occlusion.

Right external carotid artery: No acute findings. No occlusion.

Right vertebral artery: No acute findings. No significant stenosis. No dissection or occlusion.

Left common carotid artery: No acute findings. No significant stenosis. No dissection or occlusion.

Left internal carotid artery: No acute findings. Extracranial segment is patent with no significant s
tenosis. No dissection or occlusion.

Left external carotid artery: No acute findings. No occlusion.

Left vertebral artery: No acute findings. No significant stenosis. No dissection or occlusion.

 

NECK:

Bones/joints: Chronic degenerative spinal changes without acute fracture or dislocation.

Soft tissues: Nonspecific increased number of borderline enlarged submandibular nodes bilaterally.

 

CAROTID STENOSIS REFERENCE USING NASCET CRITERIA:

% ICA stenosis = 0 percent.

 

IMPRESSION:

No acute findings. No significant stenosis. No dissection or occlusion.

 

Thank you for allowing us to participate in the care of your patient.

Dictated and Authenticated by: Benito Espino MD

05/24/2018 2:46 AM Central Time ([place] & [place])

 

FINAL REPORT

CT ANGIOGRAM OF THE HEAD AND NECK:

 

History: Headache

 

Comparison: 2-27-14

 

Technique: CT angiogram of the head and neck are performed in the axial plane. 3D reformatted images 
are submitted for interpretation. 

 

FINDINGS: 

Examination is suboptimal due to poor timing of bolus. No evidence of vascular occlusion involving th
e cervical, carotid or vertebral arteries. No significant stenosis. Diminutive right A1 segment likel
y due to congenital variant. No significant stenosis at the level of the [place] of [place]. 

 

 

 

POS: [facility]

## 2018-05-24 NOTE — RAD
CHEST 1 VIEW:

 

Date:  05/24/18 

 

COMPARISON:  

01/31/18. 

 

HISTORY:  

Pain. 

 

FINDINGS:

Normal cardiac silhouette. Pulmonary vessels and hilum are normal. Costophrenic angles are clear. No 
masses or consolidation. No pneumothorax or osseous abnormalities. 

 

IMPRESSION: 

No acute cardiopulmonary process. 

 

 

POS: [facility]

## 2018-05-24 NOTE — CT
PRELIMINARY REPORT/[facility]/EMERGENTY AFTER-HOURS PROCEDURE

 

CT Head Without Intravenous Contrast

 

CLINICAL HISTORY:

47 years old, female; Pain; Headache;

 

TECHNIQUE:

Axial computed tomography images of the head/brain without intravenous contrast.

 

COMPARISON:

CT Brain WO Con 2018-05-09 00:15

 

FINDINGS:

Brain: No acute findings. No hemorrhage. No significant white matter disease. No edema.

Ventricles: No acute findings. No ventriculomegaly.

Bones/joints: No acute findings. No acute fracture.

Soft tissues: No acute findings.

Sinuses: No acute sinusitis. Mild mucosal thickening, right maxillary sinus.

Mastoid air cells: Unremarkable as visualized. No mastoid effusion.

 

IMPRESSION:

No acute intracranial pathology.

 

Thank you for allowing us to participate in the care of your patient.

Dictated and Authenticated by: Benito Espino MD

05/24/2018 1:29 AM Central Time ([place] & [place])

 

 

FINAL REPORT

HEAD CT WITHOUT CONTRAST:

 

Date:  05/24/18 

 

COMPARISON:  

None. 

 

HISTORY:  

Headache, pain. 

 

FINDINGS:

I agree with the preliminary report given by [facility]. Mild mucosal thickening of right maxillary sinus n
oted. No acute osseous abnormality. No intracranial hemorrhage, midline shift, mass effect, or ventri
cular enlargement. 

 

IMPRESSION: 

No acute findings.  

 

 

 

POS: [facility]

## 2018-05-25 NOTE — PDOC.PN
- Subjective


Encounter Start Date: 05/25/18


Encounter Start Time: 12:43





Ms. Bowers was seen in follow-up of chronic headache. She says the pain is only 

a 7/10. She also complains of continued nausea.





- Objective


Resuscitation Status: 


 











Resuscitation Status           FULL:Full Resuscitation














MAR Reviewed: Yes


Vital Signs & Weight: 


 Vital Signs (12 hours)











  Temp Pulse Resp BP Pulse Ox


 


 05/25/18 11:12  99.1 F  72  16  105/60  95


 


 05/25/18 08:00  98.1 F  65  18  


 


 05/25/18 07:33  98.1 F  65  18  96/53 L  95


 


 05/25/18 03:33  98.0 F  60  14  101/57 L  95








 Weight











Admit Weight                   247 lb


 


Weight                         251 lb














I&O: 


 











 05/24/18 05/25/18 05/26/18





 06:59 06:59 06:59


 


Intake Total 60 2783 100


 


Output Total  1 


 


Balance 60 2782 100











Result Diagrams: 


 05/24/18 00:45





 05/24/18 00:45





Phys Exam





- Physical Examination


HEENT: PERRLA


Respiratory: no wheezing, no rales, no rhonchi, clear to auscultation bilateral


Cardiovascular: RRR, no significant murmur, no rub


Gastrointestinal: soft, positive bowel sounds


Musculoskeletal: no edema





Dx/Plan


(1) Intractable headache


Code(s): R51 - HEADACHE   Status: Acute   





(2) Asthma


Code(s): J45.909 - UNSPECIFIED ASTHMA, UNCOMPLICATED   Status: Chronic   





(3) Obesity (BMI 30-39.9)


Code(s): E66.9 - OBESITY, UNSPECIFIED   Status: Chronic   





- Plan





* Intractable Headache- ? etiology


* Continue as per Neurology recommendations


* encouraged ambulation.

## 2018-05-25 NOTE — CT
PRELIMINARY REPORT/[facility]/EMERGENTY AFTER-HOURS PROCEDURE 

 

CT Angiography Head With Intravenous Contrast

 

CLINICAL HISTORY:

47 years old, female; Pain; Headache; Patient HX: Er 12; F47 presented to ed C/O intermittent HA with
 gradual onset 3 days ago. Pt reports feeling weakness with symptoms. Pt denies fever. Pt denies

position making the HA better or worse. Pt denies injury or trauma to her head or neck. Pt also repor
ts feeling tingly all over. Pt reports this HA is more severe from her previous episode. Neck pain rt
 sided; Additional info: *iv leaked during injection

 

TECHNIQUE:

Axial computed tomographic angiography images of the head with intravenous contrast using CT angiogra
phy protocol. MIP reconstructed images were created and reviewed. Coronal and sagittal reformatted im
ages were created and reviewed.

 

COMPARISON:

No relevant prior studies available.

 

FINDINGS:

Right internal carotid artery: No acute findings. Intracranial segment is patent with no significant 
stenosis. No aneurysm.

Right anterior cerebral artery: No acute findings. 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 No occ
lusion or significant stenosis. No aneurysm.

Right middle cerebral artery: No acute findings. No occlusion or significant stenosis. No aneurysm.

Right posterior cerebral artery: No acute findings. No occlusion or significant stenosis. No aneurysm
.

Right vertebral artery: Unremarkable as visualized.

Left internal carotid artery: No acute findings. Intracranial segment is patent with no significant s
tenosis. No aneurysm.

Left anterior cerebral artery: No acute findings. No occlusion or significant stenosis. No aneurysm.

Left middle cerebral artery: No acute findings. No occlusion or significant stenosis. No aneurysm.

Left posterior cerebral artery: No acute findings. No occlusion or significant stenosis. No aneurysm.


Left vertebral artery: Unremarkable as visualized.

Basilar artery: No acute findings. No occlusion or significant stenosis. No aneurysm.

Incidental note made of mild to moderate mucosal reaction, right maxillary sinus.

 

IMPRESSION:

No occlusion or significant stenosis. No aneurysm.

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

Incidental note made of mild to moderate mucosal reaction, right maxillary sinus.

_______________________________________________

CT Angiography Neck With Intravenous Contrast

 

TECHNIQUE:

Axial computed tomographic angiography images of the neck with intravenous contrast using CT angiogra
phy protocol.

 

COMPARISON:

CT Brain WO Con 2018-05-24 00:54

 

FINDINGS:

 

VASCULATURE:

Right common carotid artery: No acute findings. No significant stenosis. No dissection or occlusion.

Right internal carotid artery: No acute findings. Extracranial segment is patent with no significant 
stenosis. No dissection or occlusion.

Right external carotid artery: No acute findings. No occlusion.

Right vertebral artery: No acute findings. No significant stenosis. No dissection or occlusion.

Left common carotid artery: No acute findings. No significant stenosis. No dissection or occlusion.

Left internal carotid artery: No acute findings. Extracranial segment is patent with no significant s
tenosis. No dissection or occlusion.

Left external carotid artery: No acute findings. No occlusion.

Left vertebral artery: No acute findings. No significant stenosis. No dissection or occlusion.

 

NECK:

Bones/joints: Chronic degenerative spinal changes without acute fracture or dislocation.

Soft tissues: Nonspecific increased number of borderline enlarged submandibular nodes bilaterally.

 

CAROTID STENOSIS REFERENCE USING NASCET CRITERIA:

% ICA stenosis = 0 percent.

 

IMPRESSION:

No acute findings. No significant stenosis. No dissection or occlusion.

 

Thank you for allowing us to participate in the care of your patient.

Dictated and Authenticated by: Benito Espino MD

05/24/2018 2:46 AM Central Time ([place] & [place])

 

FINAL REPORT

CT ANGIOGRAM OF THE HEAD AND NECK:

 

History: Headache

 

Comparison: 2-27-14

 

Technique: CT angiogram of the head and neck are performed in the axial plane. 3D reformatted images 
are submitted for interpretation. 

 

FINDINGS: 

Examination is suboptimal due to poor timing of bolus. No evidence of vascular occlusion involving th
e cervical, carotid or vertebral arteries. No significant stenosis. Diminutive right A1 segment likel
y due to congenital variant. No significant stenosis at the level of the [place].

## 2018-05-26 RX ADMIN — Medication SCH: at 21:16

## 2018-05-26 NOTE — PDOC.PN
- Subjective


Encounter Start Date: 05/26/18


Encounter Start Time: 13:41





Patient seen and examined for intractable headaches. Still complains of a 

headache today. Vital signs stable and no focal signs. Had no acute events 

overnight. 








- Objective


Resuscitation Status: 


 











Resuscitation Status           FULL:Full Resuscitation














MAR Reviewed: Yes


Vital Signs & Weight: 


 Vital Signs (12 hours)











  Temp Pulse Resp BP Pulse Ox


 


 05/26/18 12:00  97.8 F  58 L  16  90/60  98


 


 05/26/18 08:00  97.5 F L  64  16   97


 


 05/26/18 07:44  97.5 F L  64  16  109/68  97


 


 05/26/18 06:18   61   109/57 L 


 


 05/26/18 06:15     109/57 L 


 


 05/26/18 04:00  97.6 F  61  16  92/52 L  97








 Weight











Admit Weight                   247 lb


 


Weight                         257 lb 14.4 oz














I&O: 


 











 05/25/18 05/26/18 05/27/18





 06:59 06:59 06:59


 


Intake Total 2783 2130 


 


Output Total 1  


 


Balance 2782 2130 











Result Diagrams: 


 05/24/18 00:45





 05/24/18 00:45


Additional Labs: 


 Accuchecks











  05/25/18





  16:43


 


POC Glucose  109














Phys Exam





- Physical Examination


Constitutional: NAD


HEENT: PERRLA, sclera anicteric


Neck: supple, full ROM


Respiratory: no wheezing, no rales, no rhonchi, clear to auscultation bilateral


Cardiovascular: RRR, no significant murmur, no rub


Gastrointestinal: soft, non-tender, no distention, positive bowel sounds


Musculoskeletal: no edema, pulses present


Neurological: non-focal, moves all 4 limbs


Psychiatric: normal affect, A&O x 3


Skin: no rash, normal turgor





Dx/Plan


(1) Intractable headache


Code(s): R51 - HEADACHE   Status: Acute   


Qualifiers: 


   Headache type: unspecified   Headache chronicity pattern: chronic headache   

Qualified Code(s): R51 - Headache   





(2) Migraine


Code(s): G43.909 - MIGRAINE, UNSP, NOT INTRACTABLE, WITHOUT STATUS MIGRAINOSUS 

  Status: Chronic   


Qualifiers: 


   Migraine type: with aura   Status migrainosus presence: with status 

migrainosus   Intractability: intractable   Qualified Code(s): G43.111 - 

Migraine with aura, intractable, with status migrainosus   





(3) Obesity (BMI 30-39.9)


Code(s): E66.9 - OBESITY, UNSPECIFIED   Status: Chronic   





- Plan


cont current plan of care, out of bed/ambulate





Neurology on board, recs appreciated. 


Continue current plan. 


Will consider a dose of Toradol if headache continues. 


Likely discharge tomorrow if continued improvement. 








Review of Systems





- Medications/Allergies


Allergies/Adverse Reactions: 


 Allergies











Allergy/AdvReac Type Severity Reaction Status Date / Time


 


Gadolinium-Containing Allergy Severe Anaphylaxis Verified 05/24/18 06:14





Contrast Medi     


 


Penicillins Allergy Severe Rash Verified 12/08/15 22:52


 


iodine Allergy Intermediate Rash Verified 12/08/15 22:52


 


meperidine HCl [From Demerol] Allergy Intermediate Rash Verified 12/08/15 22:52


 


adenosine Allergy   Verified 08/17/17 20:11


 


Sulfa (Sulfonamide Allergy   Verified 08/17/17 21:01





Antibiotics)     











Medications: 


 Current Medications





Acetaminophen (Tylenol)  650 mg PO Q4H PRN


   PRN Reason: Headache/Fever or Pain


   Last Admin: 05/24/18 12:29 Dose:  650 mg


Al Hydroxide/Mg Hydroxide (Maalox)  30 ml PO Q6H PRN


   PRN Reason: Heartburn  or Indigestion


Docusate Sodium (Colace)  100 mg PO BID [facility]


   Last Admin: 05/26/18 09:50 Dose:  Not Given


Gabapentin (Neurontin)  200 mg PO TID [facility]


   Last Admin: 05/26/18 09:50 Dose:  200 mg


Sodium Chloride (Normal Saline 0.9%)  1,000 mls @ 100 mls/hr IV .Q10H [facility]


   Last Admin: 05/26/18 05:54 Dose:  1,000 mls


Ibuprofen (Motrin)  800 mg PO Q8HR [facility]


   Last Admin: 05/26/18 13:35 Dose:  800 mg


Losartan Potassium (Cozaar)  25 mg PO HS [facility]


   Last Admin: 05/25/18 20:13 Dose:  25 mg


Metoclopramide HCl (Reglan)  5 mg IVP Q4H PRN


   PRN Reason: Nausea


   Last Admin: 05/26/18 13:35 Dose:  5 mg


Morphine Sulfate (Morphine)  2 mg SLOW IVP Q4H PRN


   PRN Reason: .MILD PAIN


   Last Admin: 05/26/18 11:36 Dose:  2 mg


Morphine Sulfate (Morphine)  4 mg SLOW IVP Q4H PRN


   PRN Reason: .MODERATE PAIN


   Last Admin: 05/25/18 03:48 Dose:  4 mg


Morphine Sulfate (Morphine)  5 mg SLOW IVP Q4H PRN


   PRN Reason: .SEVERE PAIN


Ondansetron HCl (Zofran Odt)  4 mg PO Q6H PRN


   PRN Reason: Nausea/Vomiting


   Last Admin: 05/25/18 08:49 Dose:  4 mg


Valproic Acid (Depakene)  500 mg PO BIDPRN PRN


   PRN Reason: Headache


   Last Admin: 05/26/18 09:50 Dose:  500 mg

## 2018-05-27 LAB
ANION GAP SERPL CALC-SCNC: 10 MMOL/L (ref 10–20)
BASOPHILS # BLD AUTO: 0.1 THOU/UL (ref 0–0.2)
BASOPHILS NFR BLD AUTO: 0.5 % (ref 0–1)
BUN SERPL-MCNC: 16 MG/DL (ref 7–18.7)
CALCIUM SERPL-MCNC: 8.2 MG/DL (ref 7.8–10.44)
CHLORIDE SERPL-SCNC: 108 MMOL/L (ref 98–107)
CO2 SERPL-SCNC: 24 MMOL/L (ref 22–29)
CREAT CL PREDICTED SERPL C-G-VRATE: 180 ML/MIN (ref 70–130)
EOSINOPHIL # BLD AUTO: 0.9 THOU/UL (ref 0–0.7)
EOSINOPHIL NFR BLD AUTO: 8 % (ref 0–10)
GLUCOSE SERPL-MCNC: 92 MG/DL (ref 70–105)
HGB BLD-MCNC: 12.1 G/DL (ref 12–16)
LYMPHOCYTES # BLD: 2.8 THOU/UL (ref 1.2–3.4)
LYMPHOCYTES NFR BLD AUTO: 26.4 % (ref 21–51)
MCH RBC QN AUTO: 31.6 PG (ref 27–31)
MCV RBC AUTO: 94.8 FL (ref 81–99)
MONOCYTES # BLD AUTO: 0.8 THOU/UL (ref 0.11–0.59)
MONOCYTES NFR BLD AUTO: 7.6 % (ref 0–10)
NEUTROPHILS # BLD AUTO: 6.2 THOU/UL (ref 1.4–6.5)
NEUTROPHILS NFR BLD AUTO: 57.4 % (ref 42–75)
PLATELET # BLD AUTO: 219 THOU/UL (ref 130–400)
POTASSIUM SERPL-SCNC: 3.8 MMOL/L (ref 3.5–5.1)
RBC # BLD AUTO: 3.82 MILL/UL (ref 4.2–5.4)
SODIUM SERPL-SCNC: 138 MMOL/L (ref 136–145)
WBC # BLD AUTO: 10.7 THOU/UL (ref 4.8–10.8)

## 2018-05-27 RX ADMIN — Medication SCH: at 09:20

## 2018-05-27 RX ADMIN — Medication SCH: at 20:10

## 2018-05-27 RX ADMIN — BUTALBITAL, ASPIRIN, AND CAFFEINE PRN TAB: 50; 325; 40 CAPSULE ORAL at 10:16

## 2018-05-27 RX ADMIN — BUTALBITAL, ASPIRIN, AND CAFFEINE PRN TAB: 50; 325; 40 CAPSULE ORAL at 20:09

## 2018-05-27 RX ADMIN — BUTALBITAL, ASPIRIN, AND CAFFEINE PRN TAB: 50; 325; 40 CAPSULE ORAL at 14:43

## 2018-05-27 NOTE — PDOC.PN
- Subjective


Encounter Start Date: 05/27/18


Encounter Start Time: 13:33





Patient seen and examined for intractable headaches. Still has complains of 

headahes. Neurology reviewed today. No new recommenations. 


No acute events overnight. 








- Objective


Resuscitation Status: 


 











Resuscitation Status           FULL:Full Resuscitation














MAR Reviewed: Yes


Vital Signs & Weight: 


 Vital Signs (12 hours)











  Temp Pulse Resp BP Pulse Ox


 


 05/27/18 11:47  97.9 F  76  16  98/54 L  96


 


 05/27/18 08:59  98.6 F  106 H  20   98


 


 05/27/18 08:00  98.6 F  106 H  20  129/89  98


 


 05/27/18 03:22     109/61 


 


 05/27/18 03:04  97.7 F  72  16  102/57 L  97








 Weight











Admit Weight                   247 lb


 


Weight                         260 lb 6.4 oz














I&O: 


 











 05/26/18 05/27/18 05/28/18





 06:59 06:59 06:59


 


Intake Total 2130 1437 


 


Balance 2130 1437 











Result Diagrams: 


 05/27/18 04:15





 05/27/18 04:15





Phys Exam





- Physical Examination


HEENT: PERRLA, moist MMs, sclera anicteric


Neck: no JVD, supple, full ROM


Respiratory: no wheezing, no rales, no rhonchi, clear to auscultation bilateral


Cardiovascular: RRR, no significant murmur, no rub


Gastrointestinal: soft, non-tender, no distention, positive bowel sounds


Musculoskeletal: no edema, pulses present


Neurological: non-focal, moves all 4 limbs


Psychiatric: normal affect, A&O x 3


Skin: no rash, normal turgor





Dx/Plan


(1) Intractable headache


Code(s): R51 - HEADACHE   Status: Acute   


Qualifiers: 


   Headache type: unspecified   Headache chronicity pattern: chronic headache   

Qualified Code(s): R51 - Headache   


Comment: Stable but still has headaches despite multiple interventions. Neuro 

on board. Recs appreciated.    





(2) Migraine


Code(s): G43.909 - MIGRAINE, UNSP, NOT INTRACTABLE, WITHOUT STATUS MIGRAINOSUS 

  Status: Chronic   


Qualifiers: 


   Migraine type: with aura   Status migrainosus presence: with status 

migrainosus   Intractability: intractable   Qualified Code(s): G43.111 - 

Migraine with aura, intractable, with status migrainosus   





(3) Obesity (BMI 30-39.9)


Code(s): E66.9 - OBESITY, UNSPECIFIED   Status: Chronic   





- Plan


cont current plan of care, out of bed/ambulate





Continue current managemen.


IV toradol 30 mg- one time dose for headaches- Will reassess patient 

afterwards. 








Review of Systems





- Medications/Allergies


Allergies/Adverse Reactions: 


 Allergies











Allergy/AdvReac Type Severity Reaction Status Date / Time


 


Gadolinium-Containing Allergy Severe Anaphylaxis Verified 05/24/18 06:14





Contrast Medi     


 


Penicillins Allergy Severe Rash Verified 12/08/15 22:52


 


iodine Allergy Intermediate Rash Verified 12/08/15 22:52


 


meperidine HCl [From Demerol] Allergy Intermediate Rash Verified 12/08/15 22:52


 


adenosine Allergy   Verified 08/17/17 20:11


 


Sulfa (Sulfonamide Allergy   Verified 08/17/17 21:01





Antibiotics)     











Medications: 


 Current Medications





Acetaminophen (Tylenol)  650 mg PO Q4H PRN


   PRN Reason: Headache/Fever or Pain


   Last Admin: 05/26/18 18:17 Dose:  650 mg


Acetaminophen/Butalbital/Caffeine (Fioricet)  1 tab PO Q4H PRN


   PRN Reason: Headache


   Stop: 06/01/18 08:26


   Last Admin: 05/27/18 10:16 Dose:  1 tab


Al Hydroxide/Mg Hydroxide (Maalox)  30 ml PO Q6H PRN


   PRN Reason: Heartburn  or Indigestion


Docusate Sodium (Colace)  100 mg PO BID [facility]


   Last Admin: 05/27/18 09:16 Dose:  Not Given


Gabapentin (Neurontin)  200 mg PO TID [facility]


   Last Admin: 05/27/18 09:17 Dose:  200 mg


Sodium Chloride (Normal Saline 0.9%)  1,000 mls @ 100 mls/hr IV .Q10H [facility]


   Last Admin: 05/27/18 06:17 Dose:  1,000 mls


Ibuprofen (Motrin)  800 mg PO Q8HR [facility]


   Last Admin: 05/27/18 06:16 Dose:  800 mg


Ketorolac Tromethamine (Toradol)  30 mg IVP ONE [facility]


   Stop: 06/01/18 13:31


Losartan Potassium (Cozaar)  25 mg PO HS [facility]


   Last Admin: 05/26/18 21:16 Dose:  Not Given


Metoclopramide HCl (Reglan)  5 mg IVP Q4H PRN


   PRN Reason: Nausea


   Last Admin: 05/26/18 18:16 Dose:  5 mg


Morphine Sulfate (Morphine)  2 mg SLOW IVP Q4H PRN


   PRN Reason: .MILD PAIN


   Last Admin: 05/27/18 03:23 Dose:  2 mg


Morphine Sulfate (Morphine)  4 mg SLOW IVP Q4H PRN


   PRN Reason: .MODERATE PAIN


   Last Admin: 05/25/18 03:48 Dose:  4 mg


Morphine Sulfate (Morphine)  5 mg SLOW IVP Q4H PRN


   PRN Reason: .SEVERE PAIN


Ondansetron HCl (Zofran Odt)  4 mg PO Q6H PRN


   PRN Reason: Nausea/Vomiting


   Last Admin: 05/27/18 03:23 Dose:  4 mg


Sodium Chloride (Flush - Normal Saline)  10 ml IVF Q12HR [facility]


   Last Admin: 05/27/18 09:20 Dose:  Not Given


Sodium Chloride (Flush - Normal Saline)  10 ml IVF PRN PRN


   PRN Reason: Saline Flush


Valproic Acid (Depakene)  500 mg PO BID [facility]


   Last Admin: 05/27/18 09:17 Dose:  500 mg

## 2018-05-28 VITALS — TEMPERATURE: 98.3 F | DIASTOLIC BLOOD PRESSURE: 59 MMHG | SYSTOLIC BLOOD PRESSURE: 108 MMHG

## 2018-05-28 LAB
ANION GAP SERPL CALC-SCNC: 10 MMOL/L (ref 10–20)
BASOPHILS # BLD AUTO: 0.1 THOU/UL (ref 0–0.2)
BASOPHILS NFR BLD AUTO: 1 % (ref 0–1)
BUN SERPL-MCNC: 11 MG/DL (ref 7–18.7)
CALCIUM SERPL-MCNC: 8.8 MG/DL (ref 7.8–10.44)
CHLORIDE SERPL-SCNC: 106 MMOL/L (ref 98–107)
CO2 SERPL-SCNC: 27 MMOL/L (ref 22–29)
CREAT CL PREDICTED SERPL C-G-VRATE: 165 ML/MIN (ref 70–130)
EOSINOPHIL # BLD AUTO: 1 THOU/UL (ref 0–0.7)
EOSINOPHIL NFR BLD AUTO: 11.4 % (ref 0–10)
GLUCOSE SERPL-MCNC: 117 MG/DL (ref 70–105)
HGB BLD-MCNC: 12.2 G/DL (ref 12–16)
LYMPHOCYTES # BLD: 2.2 THOU/UL (ref 1.2–3.4)
LYMPHOCYTES NFR BLD AUTO: 25.8 % (ref 21–51)
MCH RBC QN AUTO: 32.6 PG (ref 27–31)
MCV RBC AUTO: 94.9 FL (ref 81–99)
MONOCYTES # BLD AUTO: 0.5 THOU/UL (ref 0.11–0.59)
MONOCYTES NFR BLD AUTO: 5.7 % (ref 0–10)
NEUTROPHILS # BLD AUTO: 4.7 THOU/UL (ref 1.4–6.5)
NEUTROPHILS NFR BLD AUTO: 56.1 % (ref 42–75)
PLATELET # BLD AUTO: 219 THOU/UL (ref 130–400)
POTASSIUM SERPL-SCNC: 4.1 MMOL/L (ref 3.5–5.1)
RBC # BLD AUTO: 3.73 MILL/UL (ref 4.2–5.4)
SODIUM SERPL-SCNC: 139 MMOL/L (ref 136–145)
WBC # BLD AUTO: 8.4 THOU/UL (ref 4.8–10.8)

## 2018-05-28 RX ADMIN — BUTALBITAL, ASPIRIN, AND CAFFEINE PRN TAB: 50; 325; 40 CAPSULE ORAL at 09:31

## 2018-05-28 RX ADMIN — Medication SCH: at 09:32

## 2018-05-28 NOTE — DIS
DATE OF ADMISSION:  05/24/2018

 

DATE OF DISCHARGE:  05/28/2018

 

DISCHARGE DIAGNOSES:

1.  Intractable headaches, migraine.  

2.  Obesity.

3.  History of Malathi-Parkinson-White.

4.  Status post ablation.

 

HOSPITAL COURSE:  Ms. Abby Bowers is a 47-year-old female with a history of chronic migraine head
aches, subdural hematoma about 3 years ago who was recently discharged from this hospital about 3 mon
ths ago when she was admitted for intractable migraine.  For the past week or two, she started having
 a headache on and off, associated with double vision, nausea, vomiting, and generalized weakness.  S
he reported the medication she was discharged on gave her ____ in her mouth and was unable to eat pro
perly.  She also describes a sharp headache and pain in the back of her head in the occipital region 
radiating to her neck and her shoulders which makes her unable to lift her arms which was the reason 
she came to the hospital.  She had been set up at the [facility] which she says she never 
went to.  At the emergency room, physical examination was unremarkable.  Her labs were also largely u
nremarkable.  EKG showed normal sinus rhythm.  She had a CT scan done which was negative for any acut
e intracranial process as well as a CT angiogram, which was also negative for aneurysm or any evidenc
e of stenosis.

 

Assessment of intractable headache of unclear origin was made and due to the fact that this was her t
hird presentation for the same problem and she had had full workup twice in the past including CT, MR
I as an LP's without clear diagnosis and the patient also had symptoms out of proportion to her physi
jennifer examination and lab findings.  Focus was placed on symptomatic management.  She was started on he
r previous home medications.  IV morphine and antiemetics.  Neurology was also consulted who placed t
he patient on losartan and also opioids  She also received 1 dose of Toradol.  She eventually respond
ed to treatments and was deemed stable for discharge.

 

DISCHARGE MEDICATIONS:  Fioricet 1 tablet every 4 hours as needed p.r.n. for headache, gabapentin 2 t
abs 3 times daily, valproic acid 500 mg twice a day, ibuprofen 800 mg every 8 hours, meclizine 25 mg 
every 6 hours.

 

PHYSICAL EXAMINATION:  She was examined on the day of discharge.

VITAL SIGNS:  Temperature 98.3 degree Fahrenheit, pulse rate 68, respiratory rate 16, oxygen saturati
on 97% on room air, blood pressure 108/59.

GENERAL:  Not in acute distress, lying in bed comfortably.

NECK:  Supple, full range of movement.

HEENT:  PERRLA, EOMI.  Moist mucous membranes.

CARDIOVASCULAR:  S1, S2 only.  Regular rate and rhythm.  No murmurs, rubs or gallops.

RESPIRATORY:  Vesicular breath sounds bilaterally.  No wheezes, rales or rhonchi.

GASTROINTESTINAL:  Soft, nontender, nondistended.  Positive bowel sounds.

EXTREMITIES:  No edema.  Moves all extremities spontaneously.

NEUROLOGIC:  Alert and oriented to time, place and person.  No focal deficits.

PSYCHIATRIC:  Normal mood and affect.

SKIN:  Warm, dry, well-perfused.  No rashes or lesions.

 

LABORATORY DATA:  WBC 8.4, hemoglobin 12.2, platelet count 219.  Sodium 139, potassium 4.1, chloride 
106, hematocrit 27, anion gap 10, BUN 11, creatinine 0.8, glucose 117, calcium 8.8.

 

IMAGING:  CT angio, chest x-ray, brain CT, no acute pathology.

 

PROCEDURES:  None.

 

CONSULTS:  Neurology.

 

CONDITION AT DISCHARGE:  Stable and improved.

 

DIET:  Regular.

 

CARE GOALS:  To follow up with her primary care physician within 1 week of discharge.

 

ACTIVITY:  To resume as tolerated.

 

Discharge time 65 minutes including chart review and documentation.

## 2021-09-04 ENCOUNTER — HOSPITAL ENCOUNTER (INPATIENT)
Dept: HOSPITAL 92 - ERS | Age: 51
LOS: 6 days | Discharge: HOME HEALTH | DRG: 916 | End: 2021-09-10
Attending: INTERNAL MEDICINE | Admitting: STUDENT IN AN ORGANIZED HEALTH CARE EDUCATION/TRAINING PROGRAM
Payer: MEDICARE

## 2021-09-04 VITALS — BODY MASS INDEX: 41.3 KG/M2

## 2021-09-04 DIAGNOSIS — I47.2: ICD-10-CM

## 2021-09-04 DIAGNOSIS — E87.6: ICD-10-CM

## 2021-09-04 DIAGNOSIS — Z79.899: ICD-10-CM

## 2021-09-04 DIAGNOSIS — Z20.822: ICD-10-CM

## 2021-09-04 DIAGNOSIS — Z91.041: ICD-10-CM

## 2021-09-04 DIAGNOSIS — T88.6XXA: Primary | ICD-10-CM

## 2021-09-04 DIAGNOSIS — Z92.89: ICD-10-CM

## 2021-09-04 DIAGNOSIS — Z88.8: ICD-10-CM

## 2021-09-04 DIAGNOSIS — I45.6: ICD-10-CM

## 2021-09-04 DIAGNOSIS — I49.3: ICD-10-CM

## 2021-09-04 DIAGNOSIS — Z88.1: ICD-10-CM

## 2021-09-04 DIAGNOSIS — Z98.51: ICD-10-CM

## 2021-09-04 DIAGNOSIS — T78.3XXA: ICD-10-CM

## 2021-09-04 DIAGNOSIS — T36.8X5A: ICD-10-CM

## 2021-09-04 DIAGNOSIS — J45.909: ICD-10-CM

## 2021-09-04 DIAGNOSIS — Z82.49: ICD-10-CM

## 2021-09-04 DIAGNOSIS — Z28.21: ICD-10-CM

## 2021-09-04 DIAGNOSIS — Z88.2: ICD-10-CM

## 2021-09-04 DIAGNOSIS — I45.81: ICD-10-CM

## 2021-09-04 DIAGNOSIS — Z90.49: ICD-10-CM

## 2021-09-04 LAB
ALBUMIN SERPL BCG-MCNC: 4 G/DL (ref 3.5–5)
ALP SERPL-CCNC: 100 U/L (ref 40–110)
ALT SERPL W P-5'-P-CCNC: 15 U/L (ref 8–55)
ANION GAP SERPL CALC-SCNC: 14 MMOL/L (ref 10–20)
ANION GAP SERPL CALC-SCNC: 16 MMOL/L (ref 10–20)
AST SERPL-CCNC: 14 U/L (ref 5–34)
BASOPHILS # BLD AUTO: 0 THOU/UL (ref 0–0.2)
BASOPHILS # BLD AUTO: 0.1 THOU/UL (ref 0–0.2)
BASOPHILS NFR BLD AUTO: 0.4 % (ref 0–1)
BASOPHILS NFR BLD AUTO: 1.5 % (ref 0–1)
BILIRUB SERPL-MCNC: 0.6 MG/DL (ref 0.2–1.2)
BUN SERPL-MCNC: 12 MG/DL (ref 7–18.7)
BUN SERPL-MCNC: 12 MG/DL (ref 7–18.7)
CALCIUM SERPL-MCNC: 10 MG/DL (ref 7.8–10.44)
CALCIUM SERPL-MCNC: 10 MG/DL (ref 7.8–10.44)
CHLORIDE SERPL-SCNC: 102 MMOL/L (ref 98–107)
CHLORIDE SERPL-SCNC: 102 MMOL/L (ref 98–107)
CO2 SERPL-SCNC: 21 MMOL/L (ref 22–29)
CO2 SERPL-SCNC: 24 MMOL/L (ref 22–29)
CREAT CL PREDICTED SERPL C-G-VRATE: 0 ML/MIN (ref 70–130)
CREAT CL PREDICTED SERPL C-G-VRATE: 0 ML/MIN (ref 70–130)
EOSINOPHIL # BLD AUTO: 0 THOU/UL (ref 0–0.7)
EOSINOPHIL # BLD AUTO: 0 THOU/UL (ref 0–0.7)
EOSINOPHIL NFR BLD AUTO: 0.1 % (ref 0–10)
EOSINOPHIL NFR BLD AUTO: 0.2 % (ref 0–10)
GLOBULIN SER CALC-MCNC: 4.5 G/DL (ref 2.4–3.5)
GLUCOSE SERPL-MCNC: 179 MG/DL (ref 70–105)
GLUCOSE SERPL-MCNC: 226 MG/DL (ref 70–105)
HGB BLD-MCNC: 12.8 G/DL (ref 12–16)
HGB BLD-MCNC: 13.8 G/DL (ref 12–16)
LYMPHOCYTES # BLD: 0.5 THOU/UL (ref 1.2–3.4)
LYMPHOCYTES # BLD: 2 THOU/UL (ref 1.2–3.4)
LYMPHOCYTES NFR BLD AUTO: 23.1 % (ref 21–51)
LYMPHOCYTES NFR BLD AUTO: 5.8 % (ref 21–51)
MAGNESIUM SERPL-MCNC: 1.8 MG/DL (ref 1.6–2.6)
MCH RBC QN AUTO: 30.4 PG (ref 27–31)
MCH RBC QN AUTO: 31.9 PG (ref 27–31)
MCV RBC AUTO: 94.4 FL (ref 78–98)
MCV RBC AUTO: 94.9 FL (ref 78–98)
MONOCYTES # BLD AUTO: 0.1 THOU/UL (ref 0.11–0.59)
MONOCYTES # BLD AUTO: 0.1 THOU/UL (ref 0.11–0.59)
MONOCYTES NFR BLD AUTO: 0.9 % (ref 0–10)
MONOCYTES NFR BLD AUTO: 1.6 % (ref 0–10)
NEUTROPHILS # BLD AUTO: 6.3 THOU/UL (ref 1.4–6.5)
NEUTROPHILS # BLD AUTO: 8.1 THOU/UL (ref 1.4–6.5)
NEUTROPHILS NFR BLD AUTO: 73.7 % (ref 42–75)
NEUTROPHILS NFR BLD AUTO: 92.9 % (ref 42–75)
PLATELET # BLD AUTO: 289 THOU/UL (ref 130–400)
PLATELET # BLD AUTO: 295 THOU/UL (ref 130–400)
POTASSIUM SERPL-SCNC: 3.3 MMOL/L (ref 3.5–5.1)
POTASSIUM SERPL-SCNC: 3.3 MMOL/L (ref 3.5–5.1)
RBC # BLD AUTO: 4.22 MILL/UL (ref 4.2–5.4)
RBC # BLD AUTO: 4.32 MILL/UL (ref 4.2–5.4)
SODIUM SERPL-SCNC: 136 MMOL/L (ref 136–145)
SODIUM SERPL-SCNC: 137 MMOL/L (ref 136–145)
WBC # BLD AUTO: 8.5 THOU/UL (ref 4.8–10.8)
WBC # BLD AUTO: 8.7 THOU/UL (ref 4.8–10.8)

## 2021-09-04 PROCEDURE — C1751 CATH, INF, PER/CENT/MIDLINE: HCPCS

## 2021-09-04 PROCEDURE — 80053 COMPREHEN METABOLIC PANEL: CPT

## 2021-09-04 PROCEDURE — S0028 INJECTION, FAMOTIDINE, 20 MG: HCPCS

## 2021-09-04 PROCEDURE — 36415 COLL VENOUS BLD VENIPUNCTURE: CPT

## 2021-09-04 PROCEDURE — 93017 CV STRESS TEST TRACING ONLY: CPT

## 2021-09-04 PROCEDURE — 94760 N-INVAS EAR/PLS OXIMETRY 1: CPT

## 2021-09-04 PROCEDURE — 71045 X-RAY EXAM CHEST 1 VIEW: CPT

## 2021-09-04 PROCEDURE — 94640 AIRWAY INHALATION TREATMENT: CPT

## 2021-09-04 PROCEDURE — 96376 TX/PRO/DX INJ SAME DRUG ADON: CPT

## 2021-09-04 PROCEDURE — 93005 ELECTROCARDIOGRAM TRACING: CPT

## 2021-09-04 PROCEDURE — G0378 HOSPITAL OBSERVATION PER HR: HCPCS

## 2021-09-04 PROCEDURE — U0003 INFECTIOUS AGENT DETECTION BY NUCLEIC ACID (DNA OR RNA); SEVERE ACUTE RESPIRATORY SYNDROME CORONAVIRUS 2 (SARS-COV-2) (CORONAVIRUS DISEASE [COVID-19]), AMPLIFIED PROBE TECHNIQUE, MAKING USE OF HIGH THROUGHPUT TECHNOLOGIES AS DESCRIBED BY CMS-2020-01-R: HCPCS

## 2021-09-04 PROCEDURE — 84484 ASSAY OF TROPONIN QUANT: CPT

## 2021-09-04 PROCEDURE — 83735 ASSAY OF MAGNESIUM: CPT

## 2021-09-04 PROCEDURE — 36569 INSJ PICC 5 YR+ W/O IMAGING: CPT

## 2021-09-04 PROCEDURE — U0005 INFEC AGEN DETEC AMPLI PROBE: HCPCS

## 2021-09-04 PROCEDURE — 96374 THER/PROPH/DIAG INJ IV PUSH: CPT

## 2021-09-04 PROCEDURE — 85025 COMPLETE CBC W/AUTO DIFF WBC: CPT

## 2021-09-04 PROCEDURE — 93010 ELECTROCARDIOGRAM REPORT: CPT

## 2021-09-04 PROCEDURE — 80048 BASIC METABOLIC PNL TOTAL CA: CPT

## 2021-09-04 PROCEDURE — 96375 TX/PRO/DX INJ NEW DRUG ADDON: CPT

## 2021-09-04 PROCEDURE — 93306 TTE W/DOPPLER COMPLETE: CPT

## 2021-09-05 LAB
ANION GAP SERPL CALC-SCNC: 13 MMOL/L (ref 10–20)
BASOPHILS # BLD AUTO: 0.3 THOU/UL (ref 0–0.2)
BASOPHILS NFR BLD AUTO: 1.5 % (ref 0–1)
BUN SERPL-MCNC: 13 MG/DL (ref 7–18.7)
CALCIUM SERPL-MCNC: 9.5 MG/DL (ref 7.8–10.44)
CHLORIDE SERPL-SCNC: 104 MMOL/L (ref 98–107)
CO2 SERPL-SCNC: 25 MMOL/L (ref 22–29)
CREAT CL PREDICTED SERPL C-G-VRATE: 158 ML/MIN (ref 70–130)
EOSINOPHIL # BLD AUTO: 0 THOU/UL (ref 0–0.7)
EOSINOPHIL NFR BLD AUTO: 0.1 % (ref 0–10)
GLUCOSE SERPL-MCNC: 114 MG/DL (ref 70–105)
HGB BLD-MCNC: 12.2 G/DL (ref 12–16)
LYMPHOCYTES # BLD: 1.5 THOU/UL (ref 1.2–3.4)
LYMPHOCYTES NFR BLD AUTO: 8.4 % (ref 21–51)
MAGNESIUM SERPL-MCNC: 2.3 MG/DL (ref 1.6–2.6)
MCH RBC QN AUTO: 31.4 PG (ref 27–31)
MCV RBC AUTO: 95.4 FL (ref 78–98)
MONOCYTES # BLD AUTO: 1 THOU/UL (ref 0.11–0.59)
MONOCYTES NFR BLD AUTO: 5.7 % (ref 0–10)
NEUTROPHILS # BLD AUTO: 14.8 THOU/UL (ref 1.4–6.5)
NEUTROPHILS NFR BLD AUTO: 84.4 % (ref 42–75)
PLATELET # BLD AUTO: 289 THOU/UL (ref 130–400)
POTASSIUM SERPL-SCNC: 4.7 MMOL/L (ref 3.5–5.1)
RBC # BLD AUTO: 3.87 MILL/UL (ref 4.2–5.4)
SODIUM SERPL-SCNC: 137 MMOL/L (ref 136–145)
WBC # BLD AUTO: 17.5 THOU/UL (ref 4.8–10.8)

## 2021-09-06 LAB
ANION GAP SERPL CALC-SCNC: 12 MMOL/L (ref 10–20)
BASOPHILS # BLD AUTO: 0.1 THOU/UL (ref 0–0.2)
BASOPHILS NFR BLD AUTO: 0.5 % (ref 0–1)
BUN SERPL-MCNC: 13 MG/DL (ref 7–18.7)
CALCIUM SERPL-MCNC: 9.7 MG/DL (ref 7.8–10.44)
CHLORIDE SERPL-SCNC: 99 MMOL/L (ref 98–107)
CO2 SERPL-SCNC: 30 MMOL/L (ref 22–29)
CREAT CL PREDICTED SERPL C-G-VRATE: 140 ML/MIN (ref 70–130)
EOSINOPHIL # BLD AUTO: 0 THOU/UL (ref 0–0.7)
EOSINOPHIL NFR BLD AUTO: 0.4 % (ref 0–10)
GLUCOSE SERPL-MCNC: 106 MG/DL (ref 70–105)
HGB BLD-MCNC: 12.7 G/DL (ref 12–16)
LYMPHOCYTES # BLD: 3.2 THOU/UL (ref 1.2–3.4)
LYMPHOCYTES NFR BLD AUTO: 23.7 % (ref 21–51)
MCH RBC QN AUTO: 31.8 PG (ref 27–31)
MCV RBC AUTO: 95.1 FL (ref 78–98)
MONOCYTES # BLD AUTO: 1 THOU/UL (ref 0.11–0.59)
MONOCYTES NFR BLD AUTO: 7.5 % (ref 0–10)
NEUTROPHILS # BLD AUTO: 9.1 THOU/UL (ref 1.4–6.5)
NEUTROPHILS NFR BLD AUTO: 67.9 % (ref 42–75)
PLATELET # BLD AUTO: 329 THOU/UL (ref 130–400)
POTASSIUM SERPL-SCNC: 4.6 MMOL/L (ref 3.5–5.1)
RBC # BLD AUTO: 4 MILL/UL (ref 4.2–5.4)
SODIUM SERPL-SCNC: 136 MMOL/L (ref 136–145)
WBC # BLD AUTO: 13.5 THOU/UL (ref 4.8–10.8)

## 2021-09-06 RX ADMIN — MEROPENEM AND SODIUM CHLORIDE SCH MLS: 1 INJECTION, SOLUTION INTRAVENOUS at 05:30

## 2021-09-06 RX ADMIN — MEROPENEM AND SODIUM CHLORIDE SCH MLS: 1 INJECTION, SOLUTION INTRAVENOUS at 22:12

## 2021-09-06 RX ADMIN — MEROPENEM AND SODIUM CHLORIDE SCH MLS: 1 INJECTION, SOLUTION INTRAVENOUS at 15:29

## 2021-09-06 RX ADMIN — FAMOTIDINE SCH MG: 10 INJECTION, SOLUTION INTRAVENOUS at 08:51

## 2021-09-07 LAB
ANION GAP SERPL CALC-SCNC: 12 MMOL/L (ref 10–20)
BASOPHILS # BLD AUTO: 0 THOU/UL (ref 0–0.2)
BASOPHILS NFR BLD AUTO: 0.4 % (ref 0–1)
BUN SERPL-MCNC: 11 MG/DL (ref 7–18.7)
CALCIUM SERPL-MCNC: 9.7 MG/DL (ref 7.8–10.44)
CHLORIDE SERPL-SCNC: 100 MMOL/L (ref 98–107)
CO2 SERPL-SCNC: 30 MMOL/L (ref 22–29)
CREAT CL PREDICTED SERPL C-G-VRATE: 152 ML/MIN (ref 70–130)
EOSINOPHIL # BLD AUTO: 0.1 THOU/UL (ref 0–0.7)
EOSINOPHIL NFR BLD AUTO: 0.5 % (ref 0–10)
GLUCOSE SERPL-MCNC: 94 MG/DL (ref 70–105)
HGB BLD-MCNC: 13.8 G/DL (ref 12–16)
LYMPHOCYTES # BLD: 3.7 THOU/UL (ref 1.2–3.4)
LYMPHOCYTES NFR BLD AUTO: 31.3 % (ref 21–51)
MCH RBC QN AUTO: 32.1 PG (ref 27–31)
MCV RBC AUTO: 94.7 FL (ref 78–98)
MONOCYTES # BLD AUTO: 1 THOU/UL (ref 0.11–0.59)
MONOCYTES NFR BLD AUTO: 8.4 % (ref 0–10)
NEUTROPHILS # BLD AUTO: 7 THOU/UL (ref 1.4–6.5)
NEUTROPHILS NFR BLD AUTO: 59.4 % (ref 42–75)
PLATELET # BLD AUTO: 334 THOU/UL (ref 130–400)
POTASSIUM SERPL-SCNC: 3.7 MMOL/L (ref 3.5–5.1)
RBC # BLD AUTO: 4.31 MILL/UL (ref 4.2–5.4)
SODIUM SERPL-SCNC: 138 MMOL/L (ref 136–145)
WBC # BLD AUTO: 11.7 THOU/UL (ref 4.8–10.8)

## 2021-09-07 PROCEDURE — 02HV33Z INSERTION OF INFUSION DEVICE INTO SUPERIOR VENA CAVA, PERCUTANEOUS APPROACH: ICD-10-PCS | Performed by: RADIOLOGY

## 2021-09-07 PROCEDURE — B548ZZA ULTRASONOGRAPHY OF SUPERIOR VENA CAVA, GUIDANCE: ICD-10-PCS | Performed by: RADIOLOGY

## 2021-09-07 PROCEDURE — B5181ZA FLUOROSCOPY OF SUPERIOR VENA CAVA USING LOW OSMOLAR CONTRAST, GUIDANCE: ICD-10-PCS | Performed by: RADIOLOGY

## 2021-09-07 RX ADMIN — MEROPENEM AND SODIUM CHLORIDE SCH MLS: 1 INJECTION, SOLUTION INTRAVENOUS at 06:27

## 2021-09-07 RX ADMIN — MEROPENEM AND SODIUM CHLORIDE SCH MLS: 1 INJECTION, SOLUTION INTRAVENOUS at 21:04

## 2021-09-07 RX ADMIN — Medication SCH ML: at 21:05

## 2021-09-07 RX ADMIN — FAMOTIDINE SCH MG: 10 INJECTION, SOLUTION INTRAVENOUS at 08:25

## 2021-09-07 RX ADMIN — MEROPENEM AND SODIUM CHLORIDE SCH MLS: 1 INJECTION, SOLUTION INTRAVENOUS at 14:28

## 2021-09-08 LAB
ANION GAP SERPL CALC-SCNC: 11 MMOL/L (ref 10–20)
BASOPHILS # BLD AUTO: 0.1 THOU/UL (ref 0–0.2)
BASOPHILS NFR BLD AUTO: 0.4 % (ref 0–1)
BUN SERPL-MCNC: 14 MG/DL (ref 7–18.7)
CALCIUM SERPL-MCNC: 9.5 MG/DL (ref 7.8–10.44)
CHLORIDE SERPL-SCNC: 98 MMOL/L (ref 98–107)
CO2 SERPL-SCNC: 31 MMOL/L (ref 22–29)
CREAT CL PREDICTED SERPL C-G-VRATE: 150 ML/MIN (ref 70–130)
EOSINOPHIL # BLD AUTO: 0.3 THOU/UL (ref 0–0.7)
EOSINOPHIL NFR BLD AUTO: 2.6 % (ref 0–10)
GLUCOSE SERPL-MCNC: 86 MG/DL (ref 70–105)
HGB BLD-MCNC: 14.9 G/DL (ref 12–16)
LYMPHOCYTES # BLD: 3.7 THOU/UL (ref 1.2–3.4)
LYMPHOCYTES NFR BLD AUTO: 28.9 % (ref 21–51)
MCH RBC QN AUTO: 31.1 PG (ref 27–31)
MCV RBC AUTO: 94.7 FL (ref 78–98)
MONOCYTES # BLD AUTO: 0.8 THOU/UL (ref 0.11–0.59)
MONOCYTES NFR BLD AUTO: 6.6 % (ref 0–10)
NEUTROPHILS # BLD AUTO: 7.8 THOU/UL (ref 1.4–6.5)
NEUTROPHILS NFR BLD AUTO: 61.5 % (ref 42–75)
PLATELET # BLD AUTO: 346 THOU/UL (ref 130–400)
POTASSIUM SERPL-SCNC: 4 MMOL/L (ref 3.5–5.1)
RBC # BLD AUTO: 4.79 MILL/UL (ref 4.2–5.4)
SODIUM SERPL-SCNC: 136 MMOL/L (ref 136–145)
WBC # BLD AUTO: 12.6 THOU/UL (ref 4.8–10.8)

## 2021-09-08 RX ADMIN — FAMOTIDINE SCH MG: 10 INJECTION, SOLUTION INTRAVENOUS at 08:18

## 2021-09-08 RX ADMIN — MEROPENEM AND SODIUM CHLORIDE SCH MLS: 1 INJECTION, SOLUTION INTRAVENOUS at 04:55

## 2021-09-08 RX ADMIN — MEROPENEM AND SODIUM CHLORIDE SCH: 1 INJECTION, SOLUTION INTRAVENOUS at 15:29

## 2021-09-08 RX ADMIN — Medication SCH ML: at 08:19

## 2021-09-09 LAB
ANION GAP SERPL CALC-SCNC: 13 MMOL/L (ref 10–20)
BASOPHILS # BLD AUTO: 0 THOU/UL (ref 0–0.2)
BASOPHILS NFR BLD AUTO: 0.1 % (ref 0–1)
BUN SERPL-MCNC: 13 MG/DL (ref 7–18.7)
CALCIUM SERPL-MCNC: 9.7 MG/DL (ref 7.8–10.44)
CHLORIDE SERPL-SCNC: 99 MMOL/L (ref 98–107)
CO2 SERPL-SCNC: 27 MMOL/L (ref 22–29)
CREAT CL PREDICTED SERPL C-G-VRATE: 160 ML/MIN (ref 70–130)
EOSINOPHIL # BLD AUTO: 0 THOU/UL (ref 0–0.7)
EOSINOPHIL NFR BLD AUTO: 0.1 % (ref 0–10)
GLUCOSE SERPL-MCNC: 138 MG/DL (ref 70–105)
HGB BLD-MCNC: 14.7 G/DL (ref 12–16)
LYMPHOCYTES # BLD: 1.4 THOU/UL (ref 1.2–3.4)
LYMPHOCYTES NFR BLD AUTO: 8.9 % (ref 21–51)
MCH RBC QN AUTO: 29.3 PG (ref 27–31)
MCV RBC AUTO: 94.5 FL (ref 78–98)
MONOCYTES # BLD AUTO: 0.2 THOU/UL (ref 0.11–0.59)
MONOCYTES NFR BLD AUTO: 0.9 % (ref 0–10)
NEUTROPHILS # BLD AUTO: 13.9 THOU/UL (ref 1.4–6.5)
NEUTROPHILS NFR BLD AUTO: 89.9 % (ref 42–75)
PLATELET # BLD AUTO: 340 THOU/UL (ref 130–400)
POTASSIUM SERPL-SCNC: 4.3 MMOL/L (ref 3.5–5.1)
RBC # BLD AUTO: 5.01 MILL/UL (ref 4.2–5.4)
SODIUM SERPL-SCNC: 135 MMOL/L (ref 136–145)
TROPONIN I SERPL DL<=0.01 NG/ML-MCNC: (no result) NG/ML (ref ?–0.03)
WBC # BLD AUTO: 15.5 THOU/UL (ref 4.8–10.8)

## 2021-09-09 RX ADMIN — Medication SCH ML: at 21:08

## 2021-09-09 RX ADMIN — Medication SCH ML: at 08:08

## 2021-09-09 RX ADMIN — Medication SCH ML: at 01:07

## 2021-09-09 RX ADMIN — MEROPENEM AND SODIUM CHLORIDE SCH MLS: 1 INJECTION, SOLUTION INTRAVENOUS at 21:07

## 2021-09-09 RX ADMIN — FAMOTIDINE SCH MG: 10 INJECTION, SOLUTION INTRAVENOUS at 08:07

## 2021-09-10 VITALS — TEMPERATURE: 97.9 F | SYSTOLIC BLOOD PRESSURE: 118 MMHG | DIASTOLIC BLOOD PRESSURE: 71 MMHG

## 2021-09-10 LAB
ANION GAP SERPL CALC-SCNC: 12 MMOL/L (ref 10–20)
BASOPHILS # BLD AUTO: 0 THOU/UL (ref 0–0.2)
BASOPHILS NFR BLD AUTO: 0.1 % (ref 0–1)
BUN SERPL-MCNC: 16 MG/DL (ref 7–18.7)
CALCIUM SERPL-MCNC: 9.4 MG/DL (ref 7.8–10.44)
CHLORIDE SERPL-SCNC: 101 MMOL/L (ref 98–107)
CO2 SERPL-SCNC: 28 MMOL/L (ref 22–29)
CREAT CL PREDICTED SERPL C-G-VRATE: 160 ML/MIN (ref 70–130)
EOSINOPHIL # BLD AUTO: 0.1 THOU/UL (ref 0–0.7)
EOSINOPHIL NFR BLD AUTO: 0.4 % (ref 0–10)
GLUCOSE SERPL-MCNC: 114 MG/DL (ref 70–105)
HGB BLD-MCNC: 14.2 G/DL (ref 12–16)
LYMPHOCYTES # BLD: 3.6 THOU/UL (ref 1.2–3.4)
LYMPHOCYTES NFR BLD AUTO: 23.2 % (ref 21–51)
MCH RBC QN AUTO: 30.1 PG (ref 27–31)
MCV RBC AUTO: 94.9 FL (ref 78–98)
MONOCYTES # BLD AUTO: 1 THOU/UL (ref 0.11–0.59)
MONOCYTES NFR BLD AUTO: 6.5 % (ref 0–10)
NEUTROPHILS # BLD AUTO: 10.8 THOU/UL (ref 1.4–6.5)
NEUTROPHILS NFR BLD AUTO: 69.8 % (ref 42–75)
PLATELET # BLD AUTO: 307 THOU/UL (ref 130–400)
POTASSIUM SERPL-SCNC: 3.7 MMOL/L (ref 3.5–5.1)
RBC # BLD AUTO: 4.72 MILL/UL (ref 4.2–5.4)
SODIUM SERPL-SCNC: 137 MMOL/L (ref 136–145)
WBC # BLD AUTO: 15.4 THOU/UL (ref 4.8–10.8)

## 2021-09-10 RX ADMIN — FAMOTIDINE SCH MG: 10 INJECTION, SOLUTION INTRAVENOUS at 08:38

## 2021-09-10 RX ADMIN — MEROPENEM AND SODIUM CHLORIDE SCH MLS: 1 INJECTION, SOLUTION INTRAVENOUS at 11:44

## 2021-09-10 RX ADMIN — MEROPENEM AND SODIUM CHLORIDE SCH MLS: 1 INJECTION, SOLUTION INTRAVENOUS at 05:25

## 2021-09-10 RX ADMIN — Medication SCH ML: at 08:42

## 2021-09-15 ENCOUNTER — HOSPITAL ENCOUNTER (OUTPATIENT)
Dept: HOSPITAL 92 - ONC/OP | Age: 51
Discharge: HOME | End: 2021-09-15
Attending: INTERNAL MEDICINE
Payer: MEDICARE

## 2021-09-15 DIAGNOSIS — Z88.5: ICD-10-CM

## 2021-09-15 DIAGNOSIS — Z88.0: ICD-10-CM

## 2021-09-15 DIAGNOSIS — Z91.018: ICD-10-CM

## 2021-09-15 DIAGNOSIS — Z88.8: ICD-10-CM

## 2021-09-15 DIAGNOSIS — Z88.1: ICD-10-CM

## 2021-09-15 DIAGNOSIS — K57.92: Primary | ICD-10-CM

## 2021-09-15 DIAGNOSIS — Z88.2: ICD-10-CM

## 2021-09-15 DIAGNOSIS — Z91.041: ICD-10-CM

## 2021-09-15 PROCEDURE — G0463 HOSPITAL OUTPT CLINIC VISIT: HCPCS

## 2021-09-15 PROCEDURE — 99211 OFF/OP EST MAY X REQ PHY/QHP: CPT

## 2024-01-02 NOTE — PDOC.PN
- Subjective


Encounter Start Date: 05/05/18


Encounter Start Time: 09:00


Subjective: c/o headache and photophobia


-: vomited this am, cant keep anything down from am


-: says cant tolerate ergot iv shots, gets intense itching and closing of thro





-at





- Objective


Resuscitation Status: 


 











Resuscitation Status           FULL:Full Resuscitation














MAR Reviewed: Yes


Vital Signs & Weight: 


 Vital Signs (12 hours)











  Temp Pulse Resp BP BP Pulse Ox


 


 05/05/18 12:00  98.4 F  68  18  97/58 L   96


 


 05/05/18 08:05  98.4 F  67  18  113/74   96


 


 05/05/18 07:50  98.4 F  67  18    96


 


 05/05/18 03:30  98.6 F  80  18   103/66  94 L








 Weight











Weight                         240 lb














I&O: 


 











 05/04/18 05/05/18 05/06/18





 06:59 06:59 06:59


 


Intake Total  2520 


 


Balance  2520 











Result Diagrams: 


 05/05/18 03:22





 05/05/18 03:22





Phys Exam





- Physical Examination


HEENT: PERRLA, moist MMs


Neck: no JVD, supple


Respiratory: no wheezing, no rales


Cardiovascular: RRR, no significant murmur


Gastrointestinal: soft, non-tender, positive bowel sounds


Musculoskeletal: no edema, pulses present


Neurological: non-focal, moves all 4 limbs


Psychiatric: normal affect, A&O x 3





Dx/Plan


(1) Migraine


Code(s): G43.909 - MIGRAINE, UNSP, NOT INTRACTABLE, WITHOUT STATUS MIGRAINOSUS 

  Status: Acute   


Qualifiers: 


   Migraine type: with aura   Status migrainosus presence: with status 

migrainosus   Intractability: intractable   Qualified Code(s): G43.111 - 

Migraine with aura, intractable, with status migrainosus   





(2) Nausea & vomiting


Code(s): R11.2 - NAUSEA WITH VOMITING, UNSPECIFIED   Status: Acute   


Qualifiers: 


   Vomiting type: unspecified 





(3) Anxiety


Code(s): F41.9 - ANXIETY DISORDER, UNSPECIFIED   Status: Chronic   





(4) Hypothyroidism


Code(s): E03.9 - HYPOTHYROIDISM, UNSPECIFIED   Status: Chronic   


Qualifiers: 


   Hypothyroidism type: unspecified   Qualified Code(s): E03.9 - Hypothyroidism

, unspecified   





(5) WPW (Malathi-Parkinson-White syndrome)


Code(s): I45.6 - PRE-EXCITATION SYNDROME   Status: Resolved   Comment: prior h/

o ablations x3 per patient   





(6) Obesity (BMI 30-39.9)


Code(s): E66.9 - OBESITY, UNSPECIFIED   Status: Chronic   





- Plan


was placed on Lucita protocol for headache


-: dc dihydroergotamine (pt feels her throat is closing up with itching)


-: is on motrin, neurontin, valproic acid


-: may dc home if tolerating oral diet and headache is better


-: counselled to ambulate in hallway





* .








Review of Systems





- Medications/Allergies


Allergies/Adverse Reactions: 


 Allergies











Allergy/AdvReac Type Severity Reaction Status Date / Time


 


Penicillins Allergy Severe Rash Verified 12/08/15 22:52


 


iodine Allergy Intermediate Rash Verified 12/08/15 22:52


 


meperidine HCl [From Demerol] Allergy Intermediate Rash Verified 12/08/15 22:52


 


adenosine Allergy   Verified 08/17/17 20:11


 


Sulfa (Sulfonamide Allergy   Verified 08/17/17 21:01





Antibiotics)     











Medications: 


 Current Medications





Acetaminophen (Tylenol)  650 mg PO Q4H PRN


   PRN Reason: Headache/Fever or Pain


   Last Admin: 05/05/18 06:27 Dose:  650 mg


Al Hydroxide/Mg Hydroxide (Maalox)  30 ml PO Q6H PRN


   PRN Reason: Heartburn  or Indigestion


Calcium Carbonate (Tums)  1,000 mg PO Q4H PRN


   PRN Reason: Heartburn  or Indigestion


Enoxaparin Sodium (Lovenox)  40 mg SC 0900 [facility]


   Last Admin: 05/05/18 09:24 Dose:  40 mg


Famotidine (Pepcid)  20 mg PO BID [facility]


   Last Admin: 05/05/18 09:23 Dose:  20 mg


Gabapentin (Neurontin)  200 mg PO TID [facility]


   Last Admin: 05/05/18 09:23 Dose:  200 mg


Guaifenesin/Dextromethorphan (Robitussin Dm)  15 ml PO Q4H PRN


   PRN Reason: Cough


Metoclopramide HCl (Reglan)  10 mg IVP Q6H PRN


   PRN Reason: Nausea


Morphine Sulfate (Morphine)  2 mg SLOW IVP Q4H PRN


   PRN Reason: CHEST PAIN/BP ELEVATIONS


   Last Admin: 05/05/18 13:03 Dose:  2 mg


Ondansetron HCl (Zofran)  4 mg IVP Q6H PRN


   PRN Reason: Nausea/Vomiting


   Last Admin: 05/04/18 10:10 Dose:  4 mg


Ondansetron HCl (Zofran Odt)  4 mg PO Q6H PRN


   PRN Reason: Nausea/Vomiting


   Last Admin: 05/05/18 13:10 Dose:  4 mg


Senna (Senokot)  2 tab PO HSPRN PRN


   PRN Reason: Constipation


Sodium Chloride (Flush - Normal Saline)  10 ml IVF Q12HR [facility]


   Last Admin: 05/05/18 09:24 Dose:  Not Given


Sodium Chloride (Flush - Normal Saline)  10 ml IVF PRN PRN


   PRN Reason: Saline Flush


Tramadol HCl (Ultram)  50 mg PO Q6H PRN


   PRN Reason: Pain


   Last Admin: 05/05/18 06:26 Dose:  50 mg


Valproic Acid (Depakene)  500 mg PO BID [facility]


   Last Admin: 05/05/18 09:23 Dose:  500 mg
- Subjective


Encounter Start Date: 05/06/18


Encounter Start Time: 10:30





Patient seen and examined for intractable migraine. Nausea improving. No new 

complaints. No overnight events





- Objective


Resuscitation Status: 


 











Resuscitation Status           FULL:Full Resuscitation














MAR Reviewed: Yes


Vital Signs & Weight: 


 Vital Signs (12 hours)











  Temp Pulse Resp BP Pulse Ox


 


 05/06/18 19:31  97.9 F  67  18  93/61  97


 


 05/06/18 15:45  97.9 F  56 L  16  99/66  93 L


 


 05/06/18 11:55  98.2 F  56 L  16  101/68  98








 Weight











Weight                         240 lb














I&O: 


 











 05/05/18 05/06/18 05/07/18





 06:59 06:59 06:59


 


Intake Total 2520 2410 1525


 


Output Total  0 


 


Balance 2520 2410 1525











Result Diagrams: 


 05/05/18 03:22





 05/05/18 03:22





Phys Exam





- Physical Examination


Constitutional: NAD


Respiratory: no wheezing, no rhonchi


Cardiovascular: RRR, no rub


Gastrointestinal: soft, non-tender


Musculoskeletal: no edema


Neurological: non-focal, moves all 4 limbs


Psychiatric: A&O x 3





Dx/Plan





- Plan


DVT proph w/SCDs





IMPRESSION/PLAN:


1. Intractable Migraine


   Still has headache depite Lucita protocol


   One dose Imitrex


   Add Toradol PRN


   Consider increasing Valproic dose in AM if headache persistent





2. N/V due to # 1


   Cont PRN antimetics





3. Hypothyroidism


   Cont Levothyroxine





4. Obesity BMI 38.7





5. h/o WPW s/p Ablation





6. Anxiety





7. CKD 2











Review of Systems





- Review of Systems


Respiratory: negative: Cough, Dry, Shortness of Breath, Hemoptysis, SOB with 

Excertion, Pleuritic Pain, Sputum, Wheezing


Cardiovascular: negative: chest pain, palpitations, orthopnea, paroxysmal 

nocturnal dyspnea, edema, light headedness, other





- Medications/Allergies


Allergies/Adverse Reactions: 


 Allergies











Allergy/AdvReac Type Severity Reaction Status Date / Time


 


Penicillins Allergy Severe Rash Verified 12/08/15 22:52


 


iodine Allergy Intermediate Rash Verified 12/08/15 22:52


 


meperidine HCl [From Demerol] Allergy Intermediate Rash Verified 12/08/15 22:52


 


adenosine Allergy   Verified 08/17/17 20:11


 


Sulfa (Sulfonamide Allergy   Verified 08/17/17 21:01





Antibiotics)     











Medications: 


 Current Medications





Acetaminophen (Tylenol)  650 mg PO Q4H PRN


   PRN Reason: Headache/Fever or Pain


   Last Admin: 05/06/18 18:01 Dose:  650 mg


Al Hydroxide/Mg Hydroxide (Maalox)  30 ml PO Q6H PRN


   PRN Reason: Heartburn  or Indigestion


Calcium Carbonate (Tums)  1,000 mg PO Q4H PRN


   PRN Reason: Heartburn  or Indigestion


Enoxaparin Sodium (Lovenox)  40 mg SC 0900 [facility]


   Last Admin: 05/06/18 07:40 Dose:  40 mg


Famotidine (Pepcid)  20 mg PO BID [facility]


   Last Admin: 05/06/18 21:37 Dose:  20 mg


Gabapentin (Neurontin)  200 mg PO TID [facility]


   Last Admin: 05/06/18 21:36 Dose:  200 mg


Guaifenesin/Dextromethorphan (Robitussin Dm)  15 ml PO Q4H PRN


   PRN Reason: Cough


   Last Admin: 05/06/18 10:52 Dose:  15 ml


Sodium Chloride (Normal Saline 0.9%)  1,000 mls @ 75 mls/hr IV .[id number] [facility]


   Last Admin: 05/06/18 21:46 Dose:  1,000 mls


Ketorolac Tromethamine (Toradol)  15 mg IVP Q6H PRN


   PRN Reason: Moderate Pain (4-6)


   Stop: 05/11/18 08:01


   Last Admin: 05/06/18 21:37 Dose:  15 mg


Metoclopramide HCl (Reglan)  10 mg IVP Q6H PRN


   PRN Reason: Nausea


   Last Admin: 05/05/18 17:05 Dose:  10 mg


Morphine Sulfate (Morphine)  2 mg SLOW IVP Q4H PRN


   PRN Reason: CHEST PAIN/BP ELEVATIONS


   Last Admin: 05/05/18 17:05 Dose:  2 mg


Ondansetron HCl (Zofran)  4 mg IVP Q6H PRN


   PRN Reason: Nausea/Vomiting


   Last Admin: 05/04/18 10:10 Dose:  4 mg


Ondansetron HCl (Zofran Odt)  4 mg PO Q6H PRN


   PRN Reason: Nausea/Vomiting


   Last Admin: 05/06/18 07:44 Dose:  4 mg


Pantoprazole Sodium (Protonix)  40 mg PO DAILY [facility]


   Last Admin: 05/06/18 08:22 Dose:  40 mg


Senna (Senokot)  2 tab PO HSPRN PRN


   PRN Reason: Constipation


Sodium Chloride (Flush - Normal Saline)  10 ml IVF Q12HR [facility]


   Last Admin: 05/06/18 21:37 Dose:  10 ml


Sodium Chloride (Flush - Normal Saline)  10 ml IVF PRN PRN


   PRN Reason: Saline Flush


Tramadol HCl (Ultram)  50 mg PO Q6H PRN


   PRN Reason: Moderate Pain (4-6)


   Last Admin: 05/06/18 18:01 Dose:  50 mg


Valproic Acid (Depakene)  500 mg PO BID [facility]


   Last Admin: 05/06/18 21:37 Dose:  500 mg
- Subjective


Encounter Start Date: 05/07/18


Encounter Start Time: 11:00


Subjective: Patient with persistent severe headache, retroorbital radiating to 

occiput 


-: and neck. No improvement over the weekend. Vomited x3 this AM, better 


-: now with phenergan.





- Objective


Resuscitation Status: 


 











Resuscitation Status           FULL:Full Resuscitation














MAR Reviewed: Yes


Vital Signs & Weight: 


 Vital Signs (12 hours)











  Temp Pulse Resp BP Pulse Ox


 


 05/07/18 08:15  97.6 F  64  12  89/59 L  95


 


 05/07/18 03:22  98.3 F  62  16  111/72  97


 


 05/07/18 00:00  98.1 F  69  14  87/53 L  97








 Weight











Weight                         240 lb














I&O: 


 











 05/06/18 05/07/18 05/08/18





 06:59 06:59 06:59


 


Intake Total 2410 2745 


 


Output Total 0  


 


Balance 2410 2745 











Result Diagrams: 


 05/05/18 03:22





 05/05/18 03:22





Phys Exam





- Physical Examination


In moderate distress from pain


HEENT: PERRLA


Respiratory: no wheezing, no rales, no rhonchi


Cardiovascular: RRR, no significant murmur


Gastrointestinal: soft, positive bowel sounds


Neurological: non-focal, moves all 4 limbs


Psychiatric: normal affect, A&O x 3





Dx/Plan


(1) Intractable migraine


Code(s): G43.919 - MIGRAINE, UNSP, INTRACTABLE, WITHOUT STATUS MIGRAINOSUS   

Status: Acute   


Qualifiers: 


   Migraine type: with aura   Status migrainosus presence: with status 

migrainosus   Qualified Code(s): G43.111 - Migraine with aura, intractable, 

with status migrainosus   


Comment: worsened again, valproic acid added last night   





(2) Nausea & vomiting


Code(s): R11.2 - NAUSEA WITH VOMITING, UNSPECIFIED   Status: Acute   


Qualifiers: 


   Vomiting type: unspecified 


Comment: recurrent inspite of Reglan and Phenergan   





(3) Hypotension


Status: Acute   Comment: no tachycardia, possibly related to valsalva from 

vomiting or to starting Valproic Acid, fluids increased   





(4) Obesity (BMI 30-39.9)


Code(s): E66.9 - OBESITY, UNSPECIFIED   Status: Chronic   





(5) Hypothyroidism


Code(s): E03.9 - HYPOTHYROIDISM, UNSPECIFIED   Status: Chronic   


Qualifiers: 


   Hypothyroidism type: unspecified   Qualified Code(s): E03.9 - Hypothyroidism

, unspecified   





(6) WPW (Malathi-Parkinson-White syndrome)


Code(s): I45.6 - PRE-EXCITATION SYNDROME   Status: Resolved   Comment: prior h/

o ablations x3 per patient   





- Plan


cont current plan of care


No response to Lucita protocol, spoke with Dr. Iraheta and he recommended 


-: Morphine for pain in view of failed migraine treatment protocol, LP, and he


-: will reevaluate in the afternoon. BP back up a bit currently. Will observe.





* .








- Discharge Day


Encounter end time: 11:30
- Subjective


Encounter Start Date: 05/08/18


Encounter Start Time: 11:45


Subjective: Patient with persistent HA. For some reason she didn't get any of 

the 


-: morphine ordered yesterday. No changes.





- Objective


Resuscitation Status: 


 











Resuscitation Status           FULL:Full Resuscitation














MAR Reviewed: Yes


Vital Signs & Weight: 


 Vital Signs (12 hours)











  Temp Pulse Resp BP Pulse Ox


 


 05/08/18 08:38  98.3 F  61  14  89/59 L  94 L


 


 05/08/18 03:50  98.3 F  80  16  98/70  96


 


 05/08/18 03:43      95


 


 05/08/18 00:00  98.1 F  79  18  96/65  95








 Weight











Weight                         240 lb














I&O: 


 











 05/07/18 05/08/18 05/09/18





 06:59 06:59 06:59


 


Intake Total 2745 2404.5 


 


Balance 2745 2404.5 











Result Diagrams: 


 05/05/18 03:22





 05/05/18 03:22





Phys Exam





- Physical Examination


moderate distress due to pain


HEENT: moist MMs


Respiratory: no wheezing, no rales, no rhonchi


Cardiovascular: RRR, no significant murmur


Musculoskeletal: no edema


Neurological: non-focal, moves all 4 limbs


Psychiatric: normal affect, A&O x 3





Dx/Plan


(1) Intractable migraine


Code(s): G43.919 - MIGRAINE, UNSP, INTRACTABLE, WITHOUT STATUS MIGRAINOSUS   

Status: Acute   


Qualifiers: 


   Migraine type: with aura   Status migrainosus presence: with status 

migrainosus   Qualified Code(s): G43.111 - Migraine with aura, intractable, 

with status migrainosus   


Comment: worsened again, valproic acid added, adding morphine per Neurology 

recs   





(2) Nausea & vomiting


Code(s): R11.2 - NAUSEA WITH VOMITING, UNSPECIFIED   Status: Acute   


Qualifiers: 


   Vomiting type: unspecified 


Comment: recurrent inspite of Reglan and Phenergan   





(3) Hypotension


Status: Acute   Comment: no tachycardia, fluids increased, improving   





(4) Obesity (BMI 30-39.9)


Code(s): E66.9 - OBESITY, UNSPECIFIED   Status: Chronic   





(5) Hypothyroidism


Code(s): E03.9 - HYPOTHYROIDISM, UNSPECIFIED   Status: Chronic   


Qualifiers: 


   Hypothyroidism type: unspecified   Qualified Code(s): E03.9 - Hypothyroidism

, unspecified   





(6) WPW (Malathi-Parkinson-White syndrome)


Code(s): I45.6 - PRE-EXCITATION SYNDROME   Status: Resolved   Comment: prior h/

o ablations x3 per patient   





- Plan


cont current plan of care


LP with increased opening pressure 30, dropped to 24 after 10mL of fluid 


-: removed. Fluid clear without signs of infection. Awaiting neurology reeval 


-: and further recs.





* .








- Discharge Day


Encounter end time: 12:00
- Subjective


Encounter Start Date: 05/09/18


Encounter Start Time: 15:15


Subjective: f/u for intractable headache and nausea. Hospital day #5 tx with 

antiemetic


-: IVF's, Gabapentin and NSAIDs. Some relief but not complete. + vertigo


-: when ambulating or sitting up. Some blurred vision. 





- Objective


Resuscitation Status: 


 











Resuscitation Status           FULL:Full Resuscitation














MAR Reviewed: Yes


Vital Signs & Weight: 


 Vital Signs (12 hours)











  Temp Pulse Resp BP Pulse Ox


 


 05/09/18 11:20  98.2 F  75  14  101/68  96


 


 05/09/18 08:02  98.2 F  72  16  93/65  95


 


 05/09/18 04:00  98.5 F  79  16  108/71  93 L








 Weight











Weight                         240 lb














I&O: 


 











 05/08/18 05/09/18 05/10/18





 06:59 06:59 06:59


 


Intake Total 2404.5  


 


Balance 2404.5  











Result Diagrams: 


 05/09/18 05:38





 05/09/18 05:38


Additional Labs: 





Microbiology





05/07/18 13:41   Lumbar - Fluid   Body Fluid Culture - Preliminary





 Laboratory Tests











  05/03/18 05/05/18





  20:07 03:22


 


WBC  11.2 H  15.6 H











Radiology Reviewed by me: Yes (CT brain - no acute process)





Phys Exam





- Physical Examination


alert, responsive, mild distress


+ nystagmus


HEENT: PERRLA, moist MMs, sclera anicteric, oral pharynx no lesions


Neck: no nodes, no JVD, supple


Respiratory: no wheezing, no rales, no rhonchi, clear to auscultation bilateral


Cardiovascular: RRR, no significant murmur, no rub, gallop


Gastrointestinal: soft, non-tender, no distention, positive bowel sounds


Musculoskeletal: no edema, pulses present


Neurological: non-focal, normal sensation, moves all 4 limbs


Psychiatric: normal affect, A&O x 3


Skin: no rash, normal turgor, cap refill <2 seconds





Dx/Plan


(1) Intractable migraine


Code(s): G43.919 - MIGRAINE, UNSP, INTRACTABLE, WITHOUT STATUS MIGRAINOSUS   

Status: Acute   


Qualifiers: 


   Migraine type: with aura   Status migrainosus presence: with status 

migrainosus   Qualified Code(s): G43.111 - Migraine with aura, intractable, 

with status migrainosus   


Comment: Start Ibuprofen 800mg TID, start Prednisone 40mg daily, valproic acid 

added, adding morphine per Neurology recs, ROM for C-spine   





(2) Nausea & vomiting


Code(s): R11.2 - NAUSEA WITH VOMITING, UNSPECIFIED   Status: Acute   


Qualifiers: 


   Vomiting type: unspecified 


Comment: No emesis, increase Zofran 8mg IV q6h, continue Reglan   





(3) Vertigo


Code(s): R42 - DIZZINESS AND GIDDINESS   Status: Acute   Comment: ? etiology, ? 

BPPV, start Meclizine 25mg q6h    





(4) Neuropathy


Code(s): G62.9 - POLYNEUROPATHY, UNSPECIFIED   Status: Chronic   Comment: 

Continue Gabapentin 200mg TID   





- Plan


, out of bed/ambulate, DVT proph w/SCDs


Stable overall


-: Start Prednisone 40mg po daily


-: Start Meclizine 25mg q6h


-: Start Ibuprofen 800mg TID


-: Increase Zofran 8mg IV q6h prn nausea





* Consider Scopolamine patch for home
- Subjective


Encounter Start Date: 05/10/18


Encounter Start Time: 13:40


Subjective: f/u for intractable HA and nausea with mild improvement in last 24h.


-: Dizziness and vertigo mildly improved with Meclizine. 





- Objective


Resuscitation Status: 


 











Resuscitation Status           FULL:Full Resuscitation














MAR Reviewed: Yes


Vital Signs & Weight: 


 Vital Signs (12 hours)











  Temp Pulse Resp BP Pulse Ox


 


 05/10/18 11:25  97.5 F L  69  18  107/72  95


 


 05/10/18 07:55  98.1 F  65  16  95/62  94 L


 


 05/10/18 04:00  98.3 F  74  16  99/66  99








 Weight











Weight                         240 lb














Result Diagrams: 


 05/09/18 05:38





 05/09/18 05:38


Additional Labs: 





Microbiology





05/07/18 13:41   Lumbar - Fluid   Body Fluid Culture - Preliminary


05/07/18 13:41   Lumbar - Fluid   Body Fluid Culture - Preliminary





 Laboratory Tests











  05/03/18 05/05/18





  20:07 03:22


 


WBC  11.2 H  15.6 H











Radiology Reviewed by me: Yes (CT brain - no acute process 5/8/18)





Phys Exam





- Physical Examination


Constitutional: NAD


alert, responsive


HEENT: PERRLA, moist MMs, sclera anicteric, oral pharynx no lesions


mild TTP in suboccipital region


Neck: no nodes, no JVD, supple


Respiratory: no wheezing, no rales, no rhonchi, clear to auscultation bilateral


S1, S2


Cardiovascular: RRR, no significant murmur, no rub, gallop


Gastrointestinal: soft, non-tender, no distention, positive bowel sounds


Musculoskeletal: no edema, pulses present


Neurological: non-focal, normal sensation, moves all 4 limbs


Psychiatric: normal affect, A&O x 3


Skin: no rash, normal turgor, cap refill <2 seconds





Dx/Plan


(1) Intractable migraine


Code(s): G43.919 - MIGRAINE, UNSP, INTRACTABLE, WITHOUT STATUS MIGRAINOSUS   

Status: Acute   


Qualifiers: 


   Migraine type: with aura   Status migrainosus presence: with status 

migrainosus   Qualified Code(s): G43.111 - Migraine with aura, intractable, 

with status migrainosus   


Comment: Start Ibuprofen 800mg TID, start Prednisone 40mg daily, valproic acid 

added, adding morphine per Neurology recs, ROM for C-spine, check MRI brain 

today   





(2) Nausea & vomiting


Code(s): R11.2 - NAUSEA WITH VOMITING, UNSPECIFIED   Status: Acute   


Qualifiers: 


   Vomiting type: unspecified 


Comment: No emesis, increase Zofran 8mg IV q6h, continue Reglan, ADAT   





(3) Vertigo


Code(s): R42 - DIZZINESS AND GIDDINESS   Status: Acute   Comment: ? etiology, ? 

BPPV, start Meclizine 25mg q6h    





(4) Neuropathy


Code(s): G62.9 - POLYNEUROPATHY, UNSPECIFIED   Status: Chronic   Comment: 

Continue Gabapentin 200mg TID   





- Plan


out of bed/ambulate, DVT proph w/SCDs


Stable overall


-: Continue Prednisone 40mg daily


-: Continue Meclizine 25mg QID


-: Continue Zofran and Reglan


-: Check MRI brain today 





* Re-consult Neurology if no improvement in next 24h
patient refused insulin for dinner. Patient A&O2-3 at baseline but seems to be more confused and lethargic at this time. Patient educated on risks and benefits and was able to repeat and teach back risks and benefits but when RN goes to give injection patient swats his arm away stating "NO I DONT TAKE THAT" and immediately forgets the education. Patient lactulose dosing was increased earlier for worsening lethargy.